# Patient Record
Sex: FEMALE | Race: WHITE | NOT HISPANIC OR LATINO | Employment: FULL TIME | URBAN - METROPOLITAN AREA
[De-identification: names, ages, dates, MRNs, and addresses within clinical notes are randomized per-mention and may not be internally consistent; named-entity substitution may affect disease eponyms.]

---

## 2023-12-02 NOTE — PROGRESS NOTES
"Subjective:      Patient ID: Rama Bravo is a 58 y.o. female.    Chief Complaint:  frequent falls.     History of Present Illness  HPI 58 years old right handed C. Female with non significant PMHx came with Sister for the evaluation of Falls.     Started: about 4 months ago.   Describes: " my legs give up ".   Timing: seconds to minutes ( < than 2 )  Frequency: 4 times in the last few months.   Pain:  lower back / Hips / Knees.   Location: Legs.   Family: none with similar issues.   Medications: Flexeril / Norco / NSAID's.   Worsen: physical activities.   Alleviated: none.   Associated symptoms: Numbness legs.   Triggers: walking / standing / pain.   Prodrome symptoms:  none.     Upon questioning She indicated all of the sudden Knees gave up and fall - Hip / Back pain before falls.   Back pain shooting down to right Leg / diagnosed with " pinch nerve " and has had injections ( last one January 2023 ).   No back Sx.  No Nerve conduction studies.   MRI Lumbar spine done BR general - no report / imagines available.   No B and B incontinence.   Positive Orthostatic dizziness - No Syncope or near Syncope.    Review of Systems  Review of Systems   Neurological:  Positive for numbness.        Knees and Lower back pain.    All other systems reviewed and are negative.    Objective:     Neurologic Exam     Mental Status   Oriented to person, place, and time.   Registration: recalls 3 of 3 objects. Recall at 5 minutes: recalls 3 of 3 objects. Follows 3 step commands.   Attention: normal. Concentration: normal.   Speech: speech is normal   Level of consciousness: alert  Knowledge: good. Able to perform simple calculations.   Able to name object. Able to read. Able to repeat. Able to write. Normal comprehension.     Cranial Nerves     CN II   Visual fields full to confrontation.     CN III, IV, VI   Pupils are equal, round, and reactive to light.  Extraocular motions are normal.   Upgaze: normal  Downgaze: normal  Conjugate " gaze: present  Vestibulo-ocular reflex: present    CN V   Facial sensation intact.     CN VII   Facial expression full, symmetric.     CN VIII   CN VIII normal.     CN IX, X   CN IX normal.   CN X normal.     CN XI   CN XI normal.     CN XII   CN XII normal.     Motor Exam   Muscle bulk: normal  Overall muscle tone: normal    Strength   Strength 5/5 throughout.   Right neck flexion: 5/5  Left neck flexion: 5/5  Right neck extension: 5/5  Left neck extension: 5/5  Right deltoid: 5/5  Left deltoid: 5/5  Right biceps: 5/5  Left biceps: 5/5  Right triceps: 5/5  Left triceps: 5/5  Right wrist flexion: 5/5  Left wrist flexion: 5/5  Right wrist extension: 5/5  Left wrist extension: 5/5  Right interossei: 5/5  Left interossei: 5/5  Right abdominals: 5/5  Left abdominals: 5/5  Right iliopsoas: 5/5  Left iliopsoas: 5/5  Right quadriceps: 5/5  Left quadriceps: 5/5  Right hamstrin/5  Left hamstrin/5  Right glutei: 5/5  Left glutei: 5/5  Right anterior tibial: 5/5  Left anterior tibial: 5/5  Right posterior tibial: 5/5  Left posterior tibial: 5/5  Right peroneal: 5/5  Left peroneal: 5/5  Right gastroc: 5/5  Left gastroc: 5/5    Sensory Exam   Light touch normal.   Vibration normal.   Proprioception normal.   Pinprick normal.   Graphesthesia: normal  Stereognosis: normal  Positive Left SLR sign.      Gait, Coordination, and Reflexes     Gait  Gait: non-neurologic    Coordination   Romberg: negative  Finger to nose coordination: normal  Heel to shin coordination: normal  Tandem walking coordination: normal    Tremor   Resting tremor: absent  Intention tremor: absent  Action tremor: absent    Reflexes   Right brachioradialis: 2+  Left brachioradialis: 2+  Right biceps: 2+  Left biceps: 2+  Right triceps: 2+  Left triceps: 2+  Right patellar: 2+  Left patellar: 2+  Right achilles: 2+  Left achilles: 2+  Right : 2+  Left : 2+  Right plantar: normal  Left plantar: normal  Right Lehman: absent  Left Lehman:  absent  Right ankle clonus: absent  Left ankle clonus: absent  Right pendular knee jerk: absent  Left pendular knee jerk: absentAntalgic gait.        Physical Exam  Vitals and nursing note reviewed.   Constitutional:       Appearance: Normal appearance. She is normal weight.   HENT:      Head: Normocephalic and atraumatic.      Right Ear: Tympanic membrane normal.      Left Ear: Tympanic membrane normal.      Nose: Nose normal.      Mouth/Throat:      Mouth: Mucous membranes are moist.      Pharynx: Oropharynx is clear.   Eyes:      Extraocular Movements: Extraocular movements intact and EOM normal.      Conjunctiva/sclera: Conjunctivae normal.      Pupils: Pupils are equal, round, and reactive to light.   Cardiovascular:      Rate and Rhythm: Normal rate and regular rhythm.      Pulses: Normal pulses.      Heart sounds: Normal heart sounds.   Pulmonary:      Effort: Pulmonary effort is normal.      Breath sounds: Normal breath sounds.   Abdominal:      General: Abdomen is flat. Bowel sounds are normal.      Palpations: Abdomen is soft.   Genitourinary:     Comments: Deferred.   Musculoskeletal:         General: Normal range of motion.      Cervical back: Normal range of motion and neck supple.      Comments: Positive crepitus bilateral Knees to PROM - no swelling and or discoloration.    Skin:     General: Skin is warm and dry.      Capillary Refill: Capillary refill takes less than 2 seconds.   Neurological:      Mental Status: She is alert and oriented to person, place, and time. Mental status is at baseline.      Motor: Motor strength is normal.     Coordination: Finger-Nose-Finger Test, Heel to Shin Test and Romberg Test normal.      Gait: Tandem walk normal.      Deep Tendon Reflexes:      Reflex Scores:       Tricep reflexes are 2+ on the right side and 2+ on the left side.       Bicep reflexes are 2+ on the right side and 2+ on the left side.       Brachioradialis reflexes are 2+ on the right side and 2+ on  "the left side.       Patellar reflexes are 2+ on the right side and 2+ on the left side.       Achilles reflexes are 2+ on the right side and 2+ on the left side.  Psychiatric:         Mood and Affect: Mood normal.         Speech: Speech normal.         Behavior: Behavior normal.         Thought Content: Thought content normal.         Judgment: Judgment normal.        Assessment:   Patient Neurological Assessment is non focal.    - Chronic LBP.  - OA's Hips.   - OA's Knees.   - Falls.   - Lumbar Radiculopathy.     Plan:   Seems to Me Patient Falls are due to  issues - Knees / LB Pain / Hips pain, chronic issues   Patient has been dealing for some time. The possibility of Nerve compression at the Lumbar spine is   A likely possibility due to her Hx and most of her falls are due to left leg - " given up ".    [place] / NSAID's PRN.   X Ray of the both Hips / Knees.   NCS / EMG low extremities.     Please do not hesitate to contact me with any updates, questions or concerns.    Omkar Kelley MD.  General Neurologist.   "

## 2023-12-04 ENCOUNTER — HOSPITAL ENCOUNTER (OUTPATIENT)
Dept: RADIOLOGY | Facility: HOSPITAL | Age: 58
Discharge: HOME | End: 2023-12-04
Attending: PSYCHIATRY & NEUROLOGY
Payer: COMMERCIAL

## 2023-12-04 ENCOUNTER — OFFICE VISIT (OUTPATIENT)
Dept: NEUROLOGY | Facility: CLINIC | Age: 58
End: 2023-12-04
Payer: COMMERCIAL

## 2023-12-04 VITALS
BODY MASS INDEX: 26.34 KG/M2 | HEIGHT: 64 IN | SYSTOLIC BLOOD PRESSURE: 140 MMHG | DIASTOLIC BLOOD PRESSURE: 86 MMHG | HEART RATE: 88 BPM | WEIGHT: 154.31 LBS

## 2023-12-04 DIAGNOSIS — M25.551 CHRONIC PAIN OF BOTH HIPS: ICD-10-CM

## 2023-12-04 DIAGNOSIS — M54.41 CHRONIC BILATERAL LOW BACK PAIN WITH RIGHT-SIDED SCIATICA: Primary | ICD-10-CM

## 2023-12-04 DIAGNOSIS — G89.29 CHRONIC PAIN OF BOTH HIPS: ICD-10-CM

## 2023-12-04 DIAGNOSIS — G89.29 CHRONIC BILATERAL LOW BACK PAIN WITH RIGHT-SIDED SCIATICA: Primary | ICD-10-CM

## 2023-12-04 DIAGNOSIS — G89.29 CHRONIC BILATERAL LOW BACK PAIN WITH RIGHT-SIDED SCIATICA: ICD-10-CM

## 2023-12-04 DIAGNOSIS — M54.41 CHRONIC BILATERAL LOW BACK PAIN WITH RIGHT-SIDED SCIATICA: ICD-10-CM

## 2023-12-04 DIAGNOSIS — M25.561 BILATERAL CHRONIC KNEE PAIN: ICD-10-CM

## 2023-12-04 DIAGNOSIS — M25.562 BILATERAL CHRONIC KNEE PAIN: ICD-10-CM

## 2023-12-04 DIAGNOSIS — G89.29 BILATERAL CHRONIC KNEE PAIN: ICD-10-CM

## 2023-12-04 DIAGNOSIS — M25.552 CHRONIC PAIN OF BOTH HIPS: ICD-10-CM

## 2023-12-04 PROCEDURE — 3079F PR MOST RECENT DIASTOLIC BLOOD PRESSURE 80-89 MM HG: ICD-10-PCS | Mod: CPTII,S$GLB,, | Performed by: PSYCHIATRY & NEUROLOGY

## 2023-12-04 PROCEDURE — 1159F PR MEDICATION LIST DOCUMENTED IN MEDICAL RECORD: ICD-10-PCS | Mod: CPTII,S$GLB,, | Performed by: PSYCHIATRY & NEUROLOGY

## 2023-12-04 PROCEDURE — 99202 OFFICE O/P NEW SF 15 MIN: CPT | Mod: S$GLB,,, | Performed by: PSYCHIATRY & NEUROLOGY

## 2023-12-04 PROCEDURE — 73562 XR KNEE ORTHO BILAT: ICD-10-PCS | Mod: 26,,, | Performed by: RADIOLOGY

## 2023-12-04 PROCEDURE — 73562 X-RAY EXAM OF KNEE 3: CPT | Mod: 26,,, | Performed by: RADIOLOGY

## 2023-12-04 PROCEDURE — 73522 XR HIP 3 OR 4 VIEWS BILATERAL: ICD-10-PCS | Mod: 26,,, | Performed by: RADIOLOGY

## 2023-12-04 PROCEDURE — 73522 X-RAY EXAM HIPS BI 3-4 VIEWS: CPT | Mod: 26,,, | Performed by: RADIOLOGY

## 2023-12-04 PROCEDURE — 1159F MED LIST DOCD IN RCRD: CPT | Mod: CPTII,S$GLB,, | Performed by: PSYCHIATRY & NEUROLOGY

## 2023-12-04 PROCEDURE — 3008F PR BODY MASS INDEX (BMI) DOCUMENTED: ICD-10-PCS | Mod: CPTII,S$GLB,, | Performed by: PSYCHIATRY & NEUROLOGY

## 2023-12-04 PROCEDURE — 1160F RVW MEDS BY RX/DR IN RCRD: CPT | Mod: CPTII,S$GLB,, | Performed by: PSYCHIATRY & NEUROLOGY

## 2023-12-04 PROCEDURE — 99999 PR PBB SHADOW E&M-NEW PATIENT-LVL IV: CPT | Mod: PBBFAC,,, | Performed by: PSYCHIATRY & NEUROLOGY

## 2023-12-04 PROCEDURE — 1160F PR REVIEW ALL MEDS BY PRESCRIBER/CLIN PHARMACIST DOCUMENTED: ICD-10-PCS | Mod: CPTII,S$GLB,, | Performed by: PSYCHIATRY & NEUROLOGY

## 2023-12-04 PROCEDURE — 99999 PR PBB SHADOW E&M-NEW PATIENT-LVL IV: ICD-10-PCS | Mod: PBBFAC,,, | Performed by: PSYCHIATRY & NEUROLOGY

## 2023-12-04 PROCEDURE — 3077F PR MOST RECENT SYSTOLIC BLOOD PRESSURE >= 140 MM HG: ICD-10-PCS | Mod: CPTII,S$GLB,, | Performed by: PSYCHIATRY & NEUROLOGY

## 2023-12-04 PROCEDURE — 73522 X-RAY EXAM HIPS BI 3-4 VIEWS: CPT | Mod: TC

## 2023-12-04 PROCEDURE — 3008F BODY MASS INDEX DOCD: CPT | Mod: CPTII,S$GLB,, | Performed by: PSYCHIATRY & NEUROLOGY

## 2023-12-04 PROCEDURE — 99202 PR OFFICE/OUTPT VISIT, NEW, LEVL II, 15-29 MIN: ICD-10-PCS | Mod: S$GLB,,, | Performed by: PSYCHIATRY & NEUROLOGY

## 2023-12-04 PROCEDURE — 3079F DIAST BP 80-89 MM HG: CPT | Mod: CPTII,S$GLB,, | Performed by: PSYCHIATRY & NEUROLOGY

## 2023-12-04 PROCEDURE — 3077F SYST BP >= 140 MM HG: CPT | Mod: CPTII,S$GLB,, | Performed by: PSYCHIATRY & NEUROLOGY

## 2023-12-04 PROCEDURE — 73562 X-RAY EXAM OF KNEE 3: CPT | Mod: TC,50

## 2023-12-04 RX ORDER — ESCITALOPRAM OXALATE 10 MG/1
10 TABLET ORAL DAILY
COMMUNITY

## 2023-12-04 RX ORDER — MELATONIN 10 MG
CAPSULE ORAL
COMMUNITY

## 2023-12-04 RX ORDER — LUBIPROSTONE 24 UG/1
24 CAPSULE ORAL 2 TIMES DAILY
COMMUNITY

## 2023-12-04 RX ORDER — NAPROXEN 500 MG/1
500 TABLET ORAL 2 TIMES DAILY
COMMUNITY

## 2023-12-04 RX ORDER — CYCLOBENZAPRINE HCL 10 MG
10 TABLET ORAL 3 TIMES DAILY PRN
COMMUNITY

## 2023-12-04 RX ORDER — PRAVASTATIN SODIUM 10 MG/1
10 TABLET ORAL DAILY
COMMUNITY

## 2023-12-04 RX ORDER — HYDROCHLOROTHIAZIDE 25 MG/1
25 TABLET ORAL DAILY
COMMUNITY

## 2023-12-04 RX ORDER — VERAPAMIL HYDROCHLORIDE 120 MG/1
120 CAPSULE, EXTENDED RELEASE ORAL DAILY
COMMUNITY

## 2023-12-04 RX ORDER — METFORMIN HYDROCHLORIDE 1000 MG/1
1000 TABLET ORAL 2 TIMES DAILY WITH MEALS
COMMUNITY

## 2023-12-04 RX ORDER — HYDROCODONE BITARTRATE AND ACETAMINOPHEN 7.5; 325 MG/1; MG/1
1 TABLET ORAL EVERY 6 HOURS PRN
COMMUNITY

## 2024-01-30 ENCOUNTER — TELEPHONE (OUTPATIENT)
Dept: NEUROLOGY | Facility: CLINIC | Age: 59
End: 2024-01-30
Payer: COMMERCIAL

## 2024-01-30 NOTE — TELEPHONE ENCOUNTER
----- Message from Elizabeth Jonesry sent at 1/30/2024  3:32 PM CST -----  .Type:  Patient Call Back    Who Called: PT       Does the patient know what this is regarding?: PT NEVER GOT THE CALL TO SCHEDULE THE NERVE TEST. PLEASE REACH OUT TO PT ON THIS MATTER. SHE MAY NEED THE 2/6/2024 APPOINTMENT RESCHEDULED IF SHE CAN'T GET THE TEST BEFORE THEN     Would the patient rather a call back YES     Best Call Back Number: [phone]    Additional Information: Thank You

## 2024-02-05 DIAGNOSIS — M54.41 CHRONIC BILATERAL LOW BACK PAIN WITH RIGHT-SIDED SCIATICA: Primary | ICD-10-CM

## 2024-02-05 DIAGNOSIS — G89.29 CHRONIC BILATERAL LOW BACK PAIN WITH RIGHT-SIDED SCIATICA: Primary | ICD-10-CM

## 2024-04-02 ENCOUNTER — PROCEDURE VISIT (OUTPATIENT)
Dept: NEUROLOGY | Facility: CLINIC | Age: 59
End: 2024-04-02
Payer: COMMERCIAL

## 2024-04-02 DIAGNOSIS — R29.6 RECURRENT FALLS: Primary | ICD-10-CM

## 2024-04-02 DIAGNOSIS — M54.41 CHRONIC BILATERAL LOW BACK PAIN WITH RIGHT-SIDED SCIATICA: ICD-10-CM

## 2024-04-02 DIAGNOSIS — G89.29 CHRONIC BILATERAL LOW BACK PAIN WITH RIGHT-SIDED SCIATICA: ICD-10-CM

## 2024-04-02 PROCEDURE — 95910 NRV CNDJ TEST 7-8 STUDIES: CPT | Mod: S$GLB,,, | Performed by: PSYCHIATRY & NEUROLOGY

## 2024-04-02 PROCEDURE — 95886 MUSC TEST DONE W/N TEST COMP: CPT | Mod: S$GLB,,, | Performed by: PSYCHIATRY & NEUROLOGY

## 2024-04-02 NOTE — PROCEDURES
[facility]   Lorenza Johnson  Department of Neurology  [address] [place] [address] [facility]  [address]  Phone [phone]     Fax  [phone]        Full Name: Rama Bravo Gender: Female  Patient ID: [id number] YOB: 1965      Visit Date: 4/2/2024 8:26 AM  Age: 58 Years  Examining Physician: Jacquelyn Tomas M.D.  Referring Physician: Omkar Bello MD  History: EMG/NCS/BLE/Lumbosacral radiculopathy.  C/O: Falls, back pain with radiation down leg.  PMHX: Chronic LBP, OA, lumbar radiculopathy, DM, HPV, HTN, carotid stenosis, Vitamin D deficiency, HLD, hx of left CTR, facet arthropathy on left side at L5-S1.      SUMMARY     Nerve conduction studies were performed in the right and left lower extremities. The right peroneal motor study recording the extensor digitorum brevis showed a normal amplitude, normal distal latency and normal conduction velocity.  No conduction block or focal slowing was present across the fibular neck. The right tibial motor study recording the abductor hallucis brevis showed a normal amplitude, normal distal latency and normal conduction velocity.     The left peroneal motor study recording the extensor digitorum brevis showed a normal amplitude, normal distal latency and normal conduction velocity.  No conduction block or focal slowing was present across the fibular neck. The left tibial motor study recording the abductor hallucis brevis showed a normal amplitude, normal distal latency and normal conduction velocity.    Right sural sensory response showed a normal amplitude and conduction velocity.  Right superficial peroneal sensory response showed a normal amplitude and conduction velocity.        Left sural sensory response showed a normal amplitude and conduction velocity. Left superficial peroneal sensory response showed a normal amplitude and conduction velocity.      Needle EMG of the right lower extremity muscles was performed. Motor unit  potentials were large, long and polyphasic with reduced recruitment in the tibialis anterior, extensor hallucis longus, flexor digitorum longus, tibialis posterior, gluteus medius, and tensor fascia latae muscles.     Needle EMG of the left lower extremity muscles was performed. Motor unit potentials were large, long and polyphasic with reduced recruitment in the tibialis anterior, extensor hallucis longus, flexor digitorum longus, tibialis posterior, gluteus medius, and tensor fascia latae muscles.            IMPRESSION       There is electrophysiologic evidence consistent with a chronic, bilateral L5 radiculopathy.     There is no electrophysiologic evidence of lumbosacral plexopathy or peripheral neuropathy in the lower extremities.         ---------------------------------             Jacquelyn Tomas M.D., F.A.A.N.      Diplomate, American Board of Psychiatry and Neurology  Diplomate, American Board of Clinical Neurophysiology  Fellow, American Academy of Neurology             SENSORY NCS      Nerve / Sites Rec. Site Peak PP Amp Dist Mo     ms µV cm m/s   L Superficial peroneal      Lat Leg Ankle 3.65 14.5 10 40.6      Ref.  4.60 3.0  40.0   L Sural - Lat Mall      Calf Lat Mall 4.31 10.8 14 39.8      Ref.  4.60 3.0  40.0   R Superficial peroneal      Lat Leg Ankle 3.38 13.1 10 40.1      Ref.  4.60 3.0  40.0   R Sural - Lat Mall      Calf Lat Mall 4.33 13.4 14 41.5      Ref.  4.60 3.0  40.0       MOTOR NCS      Nerve / Sites Rec. Site Lat Amp Dist Mo     ms mV cm m/s   L Peroneal - EDB      Ankle EDB 4.38 3.2 8       Ref.  6.00 2.5        FibHead EDB 12.67 2.7 36 43.4      Ref.     40.0      Knee EDB 14.88 2.7 10 45.3   L Tibial - AH      Ankle AH 4.54 6.4 8 17.6      Ref.  6.00 4.0        Knee AH 12.73 4.5 36 44.0      Ref.     40.0   R Peroneal - EDB      Ankle EDB 4.42 4.3 8       Ref.  6.00 2.5        FibHead EDB 12.23 4.2 33 42.2      Ref.     40.0      Knee EDB 14.48 4.0 10 44.4   R Tibial - AH      Ankle AH  3.71 5.8 8       Ref.  6.00 4.0        Knee AH 12.06 4.1 37 44.3      Ref.     40.0       EMG Summary Table     Spontaneous MUAP Recruitment   Muscle Nerve Roots IA Fib PSW Fasc Other Amp Dur. PPP Pattern   Vastus lateralis Femoral L2-L4 N None None None - N N N N   Tibialis anterior Deep peroneal (Fibular) L4-L5 N None None None - +1 +1 +1 RED   Gastrocnemius (Medial head) Tibial S1-S2 N None None None - N N N N   Gluteus medius Superior gluteal L4-S1 N None None None - N/+1 N N/+1 N/SL RED    Tensor fasciae latae Superior gluteal L4-S1 N None None None - +1 +1 +1 RED   Tibialis posterior Tibial L4-L5 N None None None - +1 +1 +1 RED   Extensor hallucis longus Deep peroneal (Fibular) L5-S1 N None None None - N/+1 N N/+1 N/SL RED    Flexor digitorum longus Tibial L5-S2 N None None None - N/+1 N N/+1 N/SL RED                              ---------------------------------             Jacquelyn Tomas M.D., F.A.A.N.      Diplomate, American Board of Psychiatry and Neurology  Diplomate, American Board of Clinical Neurophysiology  Fellow, American Academy of Neurology

## 2024-04-14 NOTE — PROGRESS NOTES
Subjective:       Patient ID: Rama Bravo is a 58 y.o. female.    Chief Complaint: No chief complaint on file.    HPI    The patient presented on 12/ 2023  for evaluation of frequents falls.   New issues: None.   Falls: none in the last month or so.   Brought MRI Lumbar spine disc.  Also to discuss NCS/ EMG.       Review of Systems          Current Outpatient Medications:     calcium-vitamin D 250 mg-2.5 mcg (100 unit) per tablet, Take 1 tablet by mouth 2 (two) times daily., Disp: , Rfl:     cyclobenzaprine (FLEXERIL) 10 MG tablet, Take 10 mg by mouth 3 (three) times daily as needed for Muscle spasms., Disp: , Rfl:     EScitalopram oxalate (LEXAPRO) 10 MG tablet, Take 10 mg by mouth once daily., Disp: , Rfl:     hydroCHLOROthiazide (HYDRODIURIL) 25 MG tablet, Take 25 mg by mouth once daily., Disp: , Rfl:     HYDROcodone-acetaminophen (NORCO) 7.5-325 mg per tablet, Take 1 tablet by mouth every 6 (six) hours as needed for Pain., Disp: , Rfl:     linaCLOtide (LINZESS) 145 mcg Cap capsule, Take 145 mcg by mouth before breakfast., Disp: , Rfl:     lubiprostone (AMITIZA) 24 MCG Cap, Take 24 mcg by mouth 2 (two) times daily., Disp: , Rfl:     melatonin 10 mg Cap, Take by mouth., Disp: , Rfl:     metFORMIN (GLUCOPHAGE) 1000 MG tablet, Take 1,000 mg by mouth 2 (two) times daily with meals., Disp: , Rfl:     naproxen (EC NAPROSYN) 500 MG EC tablet, Take 500 mg by mouth 2 (two) times daily., Disp: , Rfl:     pravastatin (PRAVACHOL) 10 MG tablet, Take 10 mg by mouth once daily., Disp: , Rfl:     verapamiL (VERELAN) 120 MG C24P, Take 120 mg by mouth once daily., Disp: , Rfl:     Past Medical History:   Diagnosis Date    Cancer     Diabetes mellitus     HPV (human papilloma virus) anogenital infection     Hypertension     Movement disorder        Past Surgical History:   Procedure Laterality Date    adnoidectomy      CARPAL TUNNEL RELEASE      FOOT SURGERY      TONSILLECTOMY         Social History     Socioeconomic History     "Marital status:    Tobacco Use    Smoking status: Former     Types: Cigarettes       Past/Current Medical/Surgical History, Past/Current Social History, Past/Current Family History and Past/Current Medications were reviewed in detail.      Objective:     VITAL SIGNS WERE REVIEWED      GENERAL APPEARANCE:     The patient looks comfortable.    BMI    No signs of respiratory distress.    Normal breathing pattern.    No dysmorphic features    Normal eye contact.       GENERAL MEDICAL EXAM:    HEENT:  Head is atraumatic normocephalic.     FUNDOSCOPIC (OPHTHALMOSCOPIC) EXAMINATION showed no disc edema (papilledema).      NECK: No JVD. No visible lesions or goiters.     CHEST-CARDIOPULMONARY: No cyanosis. No tachypnea. Normal respiratory effort.    LTYUOGX-[id number]-[id number]: No jaundice. No stomas or lesions. No visible hernias. No catheters.     SKIN, HAIR, NAILS: No pathognomonic skin rash.No neurofibromatosis. No visible lesions.No stigmata of autoimmune disease. No clubbing.    LIMBS: No varicose veins. No visible swelling.    MUSCULOSKELETAL: No visible deformities.No visible lesions.    Neurologic Exam      No results found for: "WBC", "HGB", "HCT", "MCV", "PLT"    No results found for: "NA", "K", "CL", "CO2", "GLU", "BUN", "CREATININE", "CALCIUM", "PROT", "ALBUMIN", "BILITOT", "ALKPHOS", "AST", "ALT", "ANIONGAP", "ESTGFRAFRICA", "EGFRNONAA"    No results found for: "RRHZDIYF47"    No results found for: "TSH", "J4DNSCN", "J3FBYVQ", "THYROIDAB", "FREET4"    No results found in the last 24 hours.    No results found in the last 24 hours.      LABORATORY EVALUATION      RADIOLOGY EVALUATION       NEUROPHYSIOLOGY EVALUATION       PATHOLOGY EVALUATION        NEUROCOGNITIVE AND NEUROPSYCHOLOGY EVALUATION     Reviewed the neuroimaging independently     Assessment:   Patient Neurological Assessment is non focal.    - Chronic LBP.  - OA's Hips.   - OA's Knees.   - Falls.   - Lumbar Radiculopathy.      Plan: " "  Stable.   Discussed NCS/ EMG - has had Lumbar spine injections ( type ? ) / some helps with it.   She refused being evaluated by Ortho and or Neuro surgeries service - " I do not have any intention for Surgery intervention " as She indicated.    Continue Pain Management - next appointment June 2024.   Refer to PT program - back school.  Will download disc / reports ( NCS ) to Patient file.     [place] / NSAID's PRN.   X Ray of the both Hips - done     - .... Mild sacroiliac degenerative changes bilaterally. No significant degenerative changes involving the hips.   X Ray B - Knees -  no acute changes.    NCS / EMG low extremities - done 05/ 2023 - chronic bilateral L 5 radiculopathy - discussed with Patient.     Please do not hesitate to contact me with any updates, questions or concerns.    MEDICAL/SURGICAL COMORBIDITIES     All relevant medical comorbidities noted and managed by primary care physician and medical care team.      HEALTHY LIFESTYLE AND PREVENTATIVE CARE    The patient to adhere to the age-appropriate health maintenance guidelines including screening tests and vaccinations.   The patient to adhere to  healthy lifestyle, optimal weight, exercise, healthy diet, good sleep hygiene and avoiding drugs including smoking, alcohol and recreational drugs.    No follow-ups on file.    Omkar Bello MD    General Neurology.  "

## 2024-04-16 ENCOUNTER — OFFICE VISIT (OUTPATIENT)
Dept: NEUROLOGY | Facility: CLINIC | Age: 59
End: 2024-04-16
Payer: COMMERCIAL

## 2024-04-16 VITALS
WEIGHT: 149.94 LBS | SYSTOLIC BLOOD PRESSURE: 160 MMHG | HEART RATE: 92 BPM | BODY MASS INDEX: 25.6 KG/M2 | HEIGHT: 64 IN | DIASTOLIC BLOOD PRESSURE: 94 MMHG

## 2024-04-16 DIAGNOSIS — R29.6 RECURRENT FALLS: ICD-10-CM

## 2024-04-16 DIAGNOSIS — M54.41 CHRONIC BILATERAL LOW BACK PAIN WITH RIGHT-SIDED SCIATICA: Primary | ICD-10-CM

## 2024-04-16 DIAGNOSIS — G89.29 CHRONIC BILATERAL LOW BACK PAIN WITH RIGHT-SIDED SCIATICA: Primary | ICD-10-CM

## 2024-04-16 PROCEDURE — 3008F BODY MASS INDEX DOCD: CPT | Mod: CPTII,S$GLB,, | Performed by: PSYCHIATRY & NEUROLOGY

## 2024-04-16 PROCEDURE — 3077F SYST BP >= 140 MM HG: CPT | Mod: CPTII,S$GLB,, | Performed by: PSYCHIATRY & NEUROLOGY

## 2024-04-16 PROCEDURE — 1159F MED LIST DOCD IN RCRD: CPT | Mod: CPTII,S$GLB,, | Performed by: PSYCHIATRY & NEUROLOGY

## 2024-04-16 PROCEDURE — 99999 PR PBB SHADOW E&M-EST. PATIENT-LVL V: CPT | Mod: PBBFAC,,, | Performed by: PSYCHIATRY & NEUROLOGY

## 2024-04-16 PROCEDURE — 99213 OFFICE O/P EST LOW 20 MIN: CPT | Mod: S$GLB,,, | Performed by: PSYCHIATRY & NEUROLOGY

## 2024-04-16 PROCEDURE — 3080F DIAST BP >= 90 MM HG: CPT | Mod: CPTII,S$GLB,, | Performed by: PSYCHIATRY & NEUROLOGY

## 2024-04-16 PROCEDURE — 1160F RVW MEDS BY RX/DR IN RCRD: CPT | Mod: CPTII,S$GLB,, | Performed by: PSYCHIATRY & NEUROLOGY

## 2024-06-07 ENCOUNTER — CLINICAL SUPPORT (OUTPATIENT)
Dept: REHABILITATION | Facility: HOSPITAL | Age: 59
End: 2024-06-07
Payer: COMMERCIAL

## 2024-06-07 DIAGNOSIS — M54.41 CHRONIC BILATERAL LOW BACK PAIN WITH RIGHT-SIDED SCIATICA: ICD-10-CM

## 2024-06-07 DIAGNOSIS — R29.6 RECURRENT FALLS: ICD-10-CM

## 2024-06-07 DIAGNOSIS — M53.86 DECREASED ROM OF INTERVERTEBRAL DISCS OF LUMBAR SPINE: Primary | ICD-10-CM

## 2024-06-07 DIAGNOSIS — R53.1 GENERAL WEAKNESS: ICD-10-CM

## 2024-06-07 DIAGNOSIS — G89.29 CHRONIC BILATERAL LOW BACK PAIN WITH RIGHT-SIDED SCIATICA: ICD-10-CM

## 2024-06-07 PROCEDURE — 97161 PT EVAL LOW COMPLEX 20 MIN: CPT | Mod: PN

## 2024-06-07 PROCEDURE — 97110 THERAPEUTIC EXERCISES: CPT | Mod: PN

## 2024-06-07 PROCEDURE — 97140 MANUAL THERAPY 1/> REGIONS: CPT | Mod: PN

## 2024-06-07 NOTE — PLAN OF CARE
[facility]   Physical Therapy Initial Evaluation      Date: 6/7/2024   Name: Rama Bravo  Clinic Number: [id number]    Therapy Diagnosis:    Encounter Diagnoses   Name Primary?    Chronic bilateral low back pain with right-sided sciatica     Recurrent falls     Decreased ROM of intervertebral discs of lumbar spine Yes    General weakness       Physician: Taye Bello*     Physician Orders: PT Eval and Treat  Medical Diagnosis from Referral: M54.41,G89.29 (ICD-10-CM) - Chronic bilateral low back pain with right-sided sciatica R29.6 (ICD-10-CM) - Recurrent falls  Evaluation Date: 6/7/2024  Authorization Period Expiration: 09/17/2025  Plan of Care Expiration: 9/7/2024  Progress Note Due: 7/7/2024  Visit # / Visits authorized: 1/1   FOTO: 1/3 (last performed on 6/7/2024)    Precautions: Standard    Time In: 700  Time Out: 800  Total Billable Time (timed & untimed codes): 60 minutes    Subjective     Date of onset: 4 years     History of current condition - Rama reports she began having pain years ago and was barley able to walk and went to pain management. Pt reports back improving after receiving injections over the next 6 months. Pt reports she began having episodes of fall on her knees starting in August 2023 reports 33 falls since, where she would fall forward and land on her knees with no bodily warning of when it would happen. Pt reports clicking of bones in her right lower back when she walks    Falls: reports 33 falls over the last year reporting landing her knees     Imaging: [x] Xray [] MRI [] CT: Performed on: 12/4/23   Hip: Lower lumbar and lumbar sacral degenerative changes. Mild sacroiliac degenerative changes bilaterally. Both hip joints are maintained. No acute fracture or dislocation. No significant degenerative changes involving the hips.  Knees: Medial lateral compartment are maintained bilaterally. No acute fracture on either side. No significant degenerative  changes. No joint effusion on either side. Atherosclerosis bilaterally.     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   Current Level of Function: Independent with all ADL, IADL, community mobility and functional activities with reports of increased pain and need for increased time and frequent breaks.      Dominant Extremity:    [x] Right    [] Left    Pts goals: Pt reported goals are to decrease overall pain levels in order to return to prior functional level.     Medical History:   Past Medical History:   Diagnosis Date    Cancer     Diabetes mellitus     HPV (human papilloma virus) anogenital infection     Hypertension     Movement disorder        Surgical History:   Rama Bravo  has a past surgical history that includes Carpal tunnel release; Foot surgery; Tonsillectomy; and adnoidectomy.    Medications:   Rama has a current medication list which includes the following prescription(s): calcium-vitamin d, cyclobenzaprine, escitalopram oxalate, hydrochlorothiazide, hydrocodone-acetaminophen, linaclotide, lubiprostone, melatonin, metformin, naproxen, pravastatin, and verapamil.    Allergies:   Review of patient's allergies indicates:   Allergen Reactions    Sulfamethoxazole-trimethoprim Nausea Only        Objective        Lumbar Range of Motion:    % Limitation Pain   Flexion 25   yes        Extension 25   yes        Left Side Bending 0 yes        Right Side Bending 10 yes        Left rotation   10 yes        Right Rotation   10  yes                 STRENGTH:   L/E MMT Right  (spine) Left Pain/Dysfunction with Movement Goal   Modified (90/90) Abdominal Strength  NT ---     Hip Flexion  4/5 5/5  4+/5 B   Hip Extension  4/5 4/5  4+/5 B   Hip Abduction  4/5 4/5  4+/5 B   Knee Extension 4/5 4/5  5/5 B   Knee Flexion 4/5 4/5  5/5 B   Ankle DF 4+/5 4+/5  5/5 B   Ankle PF 4+/5 4+/5  5/5 B      DTR:   L 3/4: right 1+ left 2+  S1: right 2+ left 2+  [employer]: positive    Muscle length: Hamstrings limited bilaterally. Goal: normal            SPECIAL TESTS:    Right  (spine) Left  Goal   Straight Leg Raise [] Positive    [x] Negative [] Positive    [x] Negative Negative B    A [] Positive    [x] Negative --- Negative    ASIS Distraction  [] Positive    [x] Negative --- Negative    Posterior Shear [] Positive    [x] Negative [] Positive    [x] Negative Negative B    GABE [] Positive    [x] Negative [] Positive    [x] Negative Negative B           SENSATION  [x] Intact to Light Touch   [] Impaired:      PALPATION: Structures: Increased tenderness to palpation of: right , LOWER STRUCTURES : PSIS      POSTURE:  Pt presents with postural abnormalities which include:    [x] Forward Head   [] Increased Lumbar Lordosis   [x] Rounded Shoulder   [] Genu Recurvatum   [] Increased Thoracic Kyphosis [] Genu Valgus   [] Trunk Deviated    [] Pes Planus   [] Scapular Winging    [] Other:       Balance:  mCTSIB  Goal: 30sec ea  EO: 30s  EC: 30s  Foam EO: 30s moderate sway  Foam EC: 10s LOB corrected by PT    Walking with head turn:  Left right - ataxic gait deviating from mid line  Up/down- ataxic gait deviating from mid line      GAIT ANALYSIS The patient ambulated with the following assistive device: none; the pt presents with the following gait abnormalities: decreased knee flexion on left      Function:     Intake Outcome Measure for FOTO Lumbar Spine Survey    Therapist reviewed FOTO scores for Rama on 6/7/2024.   FOTO report - see Media section or FOTO account  for episode details    Intake Score: 40%         Treatment     Total Treatment time (time-based codes) separate from Evaluation: (20) minutes     Rama received the treatments listed below:          MANUAL THERAPY TECHNIQUES were applied for (8) minutes, including:    SIJ prone HVLAT R  Hip PROM in all directions     THERAPEUTIC EXERCISES to develop strength, endurance, ROM, flexibility, posture, and core stabilization for (2) minutes including:    Discussed and performed HEP    Patient Education and Home Exercises     Education provided: (5) minutes  PURPOSE: Patient educated on the impairments noted above and the effects of physical therapy intervention to improve overall condition and QOL.   EXERCISE: Patient was educated on all the above exercise prior/during/after for proper posture, positioning, and execution for safe performance with home exercise program.   STRENGTH: Patient educated on the importance of improved core and extremity strength in order to improve alignment of the spine and extremities with static positions and dynamic movement.     Written Home Exercises Provided: yes.  Exercises were reviewed and Rama was able to demonstrate them prior to the end of the session.  Rama demonstrated good  understanding of the education provided. See EMR under Patient Instructions for exercises provided during therapy sessions.    Assessment     Rama is a 58 y.o. female referred to outpatient Physical Therapy with a medical diagnosis of M54.41,G89.29 (ICD-10-CM) - Chronic bilateral low back pain with right-sided sciatica R29.6 (ICD-10-CM) - Recurrent falls. Pt presents with impairments in the following categories: IMPAIRMENTS: ROM, strength, endurance, balance, and core strength and stability    Pt prognosis is Excellent  Pt will benefit from skilled outpatient Physical Therapy to address the deficits stated above and in the chart below, provide pt/family education, and to maximize pt's level of independence.  "    Plan of care discussed with patient: Yes  Pt's spiritual, cultural and educational needs considered and patient is agreeable to the plan of care and goals as stated below:     Anticipated Barriers for therapy: none    Medical Necessity is demonstrated by the following  History  Co-morbidities and personal factors that may impact the plan of care [] LOW: no personal factors / co-morbidities  [x] MODERATE: 1-2 personal factors / co-morbidities  [] HIGH: 3+ personal factors / co-morbidities    Moderate / High Support Documentation:   Past Medical History:   Diagnosis Date    Cancer     Diabetes mellitus     HPV (human papilloma virus) anogenital infection     Hypertension     Movement disorder         Examination  Body Structures and Functions, activity limitations and participation restrictions that may impact the plan of care [x] LOW: addressing 1-2 elements  [] MODERATE: 3+ elements  [] HIGH: 4+ elements (please support below)    Moderate / High Support Documentation: See above in "Current Level of Function"      Clinical Presentation [x] LOW: stable  [] MODERATE: Evolving  [] HIGH: Unstable     Decision Making/ Complexity Score: low         Short Term Goals:  6 weeks Status  Date Met   PAIN: Pt will report worst pain of 5/10 in order to progress toward max functional ability and improve quality of life. [x] Progressing  [] Met  [] Not Met    FUNCTION: Patient will demonstrate improved function as indicated by a score of greater than or equal to 45 out of 100 on FOTO. [x] Progressing  [] Met  [] Not Met    MOBILITY: Patient will improve AROM to 50% of stated goals, listed in objective measures above, in order to progress towards independence with functional activities.  [x] Progressing  [] Met  [] Not Met    STRENGTH: Patient will improve strength to 50% of stated goals, listed in objective measures above, in order to progress towards independence with functional activities. [x] Progressing  [] Met  [] Not Met  "   POSTURE: Patient will correct postural deviations in sitting and standing, to decrease pain and promote long term stability.  [x] Progressing  [] Met  [] Not Met    GAIT: Patient will demonstrate improved gait mechanics including increased knee flexon on left in order to improve functional mobility, improve quality of life, and decrease risk of further injury or fall.  [x] Progressing  [] Met  [] Not Met    HEP: Patient will demonstrate independence with HEP in order to progress toward functional independence. [x] Progressing  [] Met  [] Not Met      Long Term Goals:  12 weeks Status Date Met   PAIN: Pt will report worst pain of 1/10 in order to progress toward max functional ability and improve quality of life [x] Progressing  [] Met  [] Not Met    FUNCTION: Patient will demonstrate improved function as indicated by a score of greater than or equal to 52 out of 100 on FOTO. [x] Progressing  [] Met  [] Not Met    MOBILITY: Patient will improve AROM to stated goals, listed in objective measures above, in order to return to maximal functional potential and improve quality of life.  [x] Progressing  [] Met  [] Not Met    STRENGTH: Patient will improve strength to stated goals, listed in objective measures above, in order to improve functional independence and quality of life.  [x] Progressing  [] Met  [] Not Met    GAIT: Patient will demonstrate normalized gait mechanics with minimal compensation in order to return to PLOF. [x] Progressing  [] Met  [] Not Met    Patient will return to normal ADL's, IADL's, community involvement, recreational activities, and work-related activities with less than or equal to 1/10 pain and maximal function.  [x] Progressing  [] Met  [] Not Met    Patient to be able to walk without gait devation while performing head turns. [x] Progressing  [] Met  [] Not Met    Patient to be able to perform 30 seconds in each section of mCTSIB [x] Progressing  [] Met  [] Not Met      Plan     Plan of  care Certification: 6/7/2024 to 9/7/2024.    Outpatient Physical Therapy 2 times weekly for 12 weeks to include any combination of the following interventions: virtual visits, dry needling, modalities, electrical stimulation (IFC, Pre-Mod, Attended with Functional Dry Needling), Cervical/Lumbar Traction, Gait Training, Manual Therapy, Neuromuscular Re-ed, Patient Education, Self Care, Therapeutic Activities, Therapeutic Exercise, and Therapeutic Activites     Shekhar Johnson, PT, DPT

## 2024-06-13 ENCOUNTER — CLINICAL SUPPORT (OUTPATIENT)
Dept: REHABILITATION | Facility: HOSPITAL | Age: 59
End: 2024-06-13
Payer: COMMERCIAL

## 2024-06-13 DIAGNOSIS — M53.86 DECREASED ROM OF INTERVERTEBRAL DISCS OF LUMBAR SPINE: ICD-10-CM

## 2024-06-13 DIAGNOSIS — R53.1 GENERAL WEAKNESS: ICD-10-CM

## 2024-06-13 DIAGNOSIS — R29.6 RECURRENT FALLS: Primary | ICD-10-CM

## 2024-06-13 PROCEDURE — 97110 THERAPEUTIC EXERCISES: CPT | Mod: PN

## 2024-06-13 PROCEDURE — 97112 NEUROMUSCULAR REEDUCATION: CPT | Mod: PN

## 2024-06-13 NOTE — PROGRESS NOTES
"[facility]   Physical Therapy Treatment Note        Name: Rama Bravo  Clinic Number: [id number]    Therapy Diagnosis: No diagnosis found.  Physician: Taye Bello*    Visit Date: 6/13/2024    Physician Orders: PT Eval and Treat  Medical Diagnosis from Referral: M54.41,G89.29 (ICD-10-CM) - Chronic bilateral low back pain with right-sided sciatica R29.6 (ICD-10-CM) - Recurrent falls  Evaluation Date: 6/7/2024  Authorization Period Expiration: 09/17/2025  Plan of Care Expiration: 9/7/2024  Progress Note Due: 7/7/2024  Visit # / Visits authorized: 1/1   FOTO: 1/3 (last performed on 6/7/2024)     Precautions: Standard  PTA Visit #: 0/5     Time In: 1100  Time Out: 1200  Total Billable Time: 55 minutes (Billing reflects 1 on 1 treatment time spent with patient)    Subjective     Patient reports: she feels weak coming into today's session.    He/She was compliant with home exercise program.  Response to previous treatment: no notable change  Functional change: no notable change    Pain: 6/10     Location: lower back    Objective      Objective Measures updated at progress report or POC update only unless otherwise noted.       Treatment     Rama received the treatments listed below:     CPT Intervention  Joint[employer] Duration / Intensity  6/13   TE Bike 6'   TE Shuttle press DL 5B 3', SL 2B 2x10 B    TE Knee ext DL 10# 2x10   TE  HS curl DL 20# 2x10    NMR  Walking /c head turns  left-right/up-down 2 laps ea    NMR Step backs 10x B,     NMR Step forward 10x B    NMR  NBOS /c pertibations 30" 3x    NMR Stepping strategies Forward/ backward/ lateral 2x10 B ea   PLAN             CPT Codes available for Billing:   (0) minutes of Manual therapy (MT) to improve pain and ROM.  (30) minutes of Therapeutic Exercise (TE) to develop strength, endurance, range of motion, and flexibility.  (25) minutes of Neuromuscular Re-Education (NMR)  to improve: Balance, Coordination, Kinesthetic, Sense, " Proprioception, and Posture.  (0) minutes of Therapeutic Activities (TA) to improve functional performance.  Unattended Electrical Stimulation (ES) for muscle performance or pain modulation.  Vasopneumatic Device Therapy () for management of swelling/edema. ([id number])  BFR: Blood flow restriction applied during exercise  NP or (-): Not Performed       Patient Education and Home Exercises       Home Exercises Provided and Patient Education Provided     Education provided: (5) minutes  PURPOSE: Patient educated on the impairments noted above and the effects of physical therapy intervention to improve overall condition and QOL.   EXERCISE: Patient was educated on all the above exercise prior/during/after for proper posture, positioning, and execution for safe performance with home exercise program.   GAIT & BALANCE: Patient educated on the importance of strong core and lower extremity musculature in order to improve both static and dynamic balance, improve gait mechanics, reduce fall risk and improve household and community mobility.     Written Home Exercises Provided: yes.  Exercises were reviewed and Rama was able to demonstrate them prior to the end of the session.  Rama demonstrated good  understanding of the education provided. See EMR under Patient Instructions for exercises provided during therapy sessions.    Assessment     Patient tolerated today's session with minimal complaints of pain. Patient displayed bilateral lower extremity weakness with multiple rest breaks due to deconditioning. Patient had lateral gait ataxia when adding head turns both vertical and horizontally. Today we also worked on ankle and stepping strategies to improve reaction when having loss of balance. Patient will continue to benefit from.    Rama is progressing well towards her goals.   Pt prognosis is Excellent.     Pt will continue to benefit from skilled outpatient physical therapy to address the deficits listed in the problem  list box on initial evaluation, provide pt/family education and to maximize pt's level of independence in the home and community environment.     Pt's spiritual, cultural and educational needs considered and pt agreeable to plan of care and goals.     Anticipated Barriers for therapy: none    Short Term Goals:  6 weeks Status  Date Met   PAIN: Pt will report worst pain of 5/10 in order to progress toward max functional ability and improve quality of life. [x] Progressing  [] Met  [] Not Met     FUNCTION: Patient will demonstrate improved function as indicated by a score of greater than or equal to 45 out of 100 on FOTO. [x] Progressing  [] Met  [] Not Met     MOBILITY: Patient will improve AROM to 50% of stated goals, listed in objective measures above, in order to progress towards independence with functional activities.  [x] Progressing  [] Met  [] Not Met     STRENGTH: Patient will improve strength to 50% of stated goals, listed in objective measures above, in order to progress towards independence with functional activities. [x] Progressing  [] Met  [] Not Met     POSTURE: Patient will correct postural deviations in sitting and standing, to decrease pain and promote long term stability.  [x] Progressing  [] Met  [] Not Met     GAIT: Patient will demonstrate improved gait mechanics including increased knee flexon on left in order to improve functional mobility, improve quality of life, and decrease risk of further injury or fall.  [x] Progressing  [] Met  [] Not Met     HEP: Patient will demonstrate independence with HEP in order to progress toward functional independence. [x] Progressing  [] Met  [] Not Met        Long Term Goals:  12 weeks Status Date Met   PAIN: Pt will report worst pain of 1/10 in order to progress toward max functional ability and improve quality of life [x] Progressing  [] Met  [] Not Met     FUNCTION: Patient will demonstrate improved function as indicated by a score of greater than or  equal to 52 out of 100 on FOTO. [x] Progressing  [] Met  [] Not Met     MOBILITY: Patient will improve AROM to stated goals, listed in objective measures above, in order to return to maximal functional potential and improve quality of life.  [x] Progressing  [] Met  [] Not Met     STRENGTH: Patient will improve strength to stated goals, listed in objective measures above, in order to improve functional independence and quality of life.  [x] Progressing  [] Met  [] Not Met     GAIT: Patient will demonstrate normalized gait mechanics with minimal compensation in order to return to PLOF. [x] Progressing  [] Met  [] Not Met     Patient will return to normal ADL's, IADL's, community involvement, recreational activities, and work-related activities with less than or equal to 1/10 pain and maximal function.  [x] Progressing  [] Met  [] Not Met     Patient to be able to walk without gait devation while performing head turns. [x] Progressing  [] Met  [] Not Met     Patient to be able to perform 30 seconds in each section of mCTSIB [x] Progressing  [] Met  [] Not Met         Plan     Continue Plan of Care (POC) and progress per patient tolerance. See treatment section for details on planned progressions next session.      Shekhar Johnson, PT

## 2024-06-19 ENCOUNTER — DOCUMENTATION ONLY (OUTPATIENT)
Dept: REHABILITATION | Facility: HOSPITAL | Age: 59
End: 2024-06-19
Payer: COMMERCIAL

## 2024-06-19 NOTE — PROGRESS NOTES
Pt came to today's session stating she feels weak, lightheaded, and dizzy. Pt reports she has only had one nutrigrain bar and has been drinking water for the last 2 days. I canceled today's visit and I encouraged her to follow up with her primary care MD.

## 2024-06-25 ENCOUNTER — CLINICAL SUPPORT (OUTPATIENT)
Dept: REHABILITATION | Facility: HOSPITAL | Age: 59
End: 2024-06-25
Payer: COMMERCIAL

## 2024-06-25 DIAGNOSIS — R29.6 RECURRENT FALLS: Primary | ICD-10-CM

## 2024-06-25 DIAGNOSIS — R53.1 GENERAL WEAKNESS: ICD-10-CM

## 2024-06-25 DIAGNOSIS — M53.86 DECREASED ROM OF INTERVERTEBRAL DISCS OF LUMBAR SPINE: ICD-10-CM

## 2024-06-25 PROCEDURE — 97112 NEUROMUSCULAR REEDUCATION: CPT | Mod: PN

## 2024-06-25 NOTE — PROGRESS NOTES
"[facility]   Physical Therapy Treatment Note        Name: Rama Bravo  Clinic Number: [id number]    Therapy Diagnosis:   Encounter Diagnoses   Name Primary?    Recurrent falls Yes    Decreased ROM of intervertebral discs of lumbar spine     General weakness      Physician: Taye Bello*    Visit Date: 6/25/2024    Physician Orders: PT Eval and Treat  Medical Diagnosis from Referral: M54.41,G89.29 (ICD-10-CM) - Chronic bilateral low back pain with right-sided sciatica R29.6 (ICD-10-CM) - Recurrent falls  Evaluation Date: 6/7/2024  Authorization Period Expiration: 09/17/2025  Plan of Care Expiration: 9/7/2024  Progress Note Due: 7/7/2024  Visit # / Visits authorized: 2/20+eval   FOTO: 1/3 (last performed on 6/7/2024)     Precautions: Standard  PTA Visit #: 0/5     Time In: 1000  Time Out: 1100  Total Billable Time: 15 minutes (Billing reflects 1 on 1 treatment time spent with patient)    Subjective     Patient reports: feel much better following recovering from sickness    He/She was compliant with home exercise program.  Response to previous treatment: no notable change  Functional change: no notable change    Pain: 6/10     Location: lower back    Objective      Objective Measures updated at progress report or POC update only unless otherwise noted.       Treatment     Rama received the treatments listed below:     CPT Intervention  JointFocus Duration / Intensity  6/13   TE Bike 6'   TE Shuttle press DL 5B 3', SL 2B 2x10 B    TE Knee ext DL 10# 2x10   TE  HS curl DL 20# 2x10    NMR  Walking /c head turns  left-right/up-down 2 laps ea    NMR Step backs 10x B,     NMR Step forward 10x B    NMR  NBOS /c pertibations 30" 3x    NMR Stepping strategies Forward/ backward/ lateral 2x10 B ea   PLAN             CPT Codes available for Billing:   (0) minutes of Manual therapy (MT) to improve pain and ROM.  (0) minutes of Therapeutic Exercise (TE) to develop strength, endurance, range of " motion, and flexibility.  (15) minutes of Neuromuscular Re-Education (NMR)  to improve: Balance, Coordination, Kinesthetic, Sense, Proprioception, and Posture.  (0) minutes of Therapeutic Activities (TA) to improve functional performance.  Unattended Electrical Stimulation (ES) for muscle performance or pain modulation.  Vasopneumatic Device Therapy () for management of swelling/edema. (44457)  BFR: Blood flow restriction applied during exercise  NP or (-): Not Performed       Patient Education and Home Exercises       Home Exercises Provided and Patient Education Provided     Education provided: (5) minutes  PURPOSE: Patient educated on the impairments noted above and the effects of physical therapy intervention to improve overall condition and QOL.   EXERCISE: Patient was educated on all the above exercise prior/during/after for proper posture, positioning, and execution for safe performance with home exercise program.   GAIT & BALANCE: Patient educated on the importance of strong core and lower extremity musculature in order to improve both static and dynamic balance, improve gait mechanics, reduce fall risk and improve household and community mobility.     Written Home Exercises Provided: yes.  Exercises were reviewed and Rama was able to demonstrate them prior to the end of the session.  Rama demonstrated good  understanding of the education provided. See EMR under Patient Instructions for exercises provided during therapy sessions.    Assessment     Patient tolerated today's session with minimal complaints of pain. Patient displayed bilateral lower extremity weakness with multiple rest breaks due to deconditioning. Patient had lateral gait ataxia when adding head turns both vertical and horizontally. Today we also worked on ankle and stepping strategies to improve reaction when having loss of balance. Patient will continue to benefit from.    Rama is progressing well towards her goals.   Pt prognosis is  Excellent.     Pt will continue to benefit from skilled outpatient physical therapy to address the deficits listed in the problem list box on initial evaluation, provide pt/family education and to maximize pt's level of independence in the home and community environment.     Pt's spiritual, cultural and educational needs considered and pt agreeable to plan of care and goals.     Anticipated Barriers for therapy: none    Short Term Goals:  6 weeks Status  Date Met   PAIN: Pt will report worst pain of 5/10 in order to progress toward max functional ability and improve quality of life. [x] Progressing  [] Met  [] Not Met     FUNCTION: Patient will demonstrate improved function as indicated by a score of greater than or equal to 45 out of 100 on FOTO. [x] Progressing  [] Met  [] Not Met     MOBILITY: Patient will improve AROM to 50% of stated goals, listed in objective measures above, in order to progress towards independence with functional activities.  [x] Progressing  [] Met  [] Not Met     STRENGTH: Patient will improve strength to 50% of stated goals, listed in objective measures above, in order to progress towards independence with functional activities. [x] Progressing  [] Met  [] Not Met     POSTURE: Patient will correct postural deviations in sitting and standing, to decrease pain and promote long term stability.  [x] Progressing  [] Met  [] Not Met     GAIT: Patient will demonstrate improved gait mechanics including increased knee flexon on left in order to improve functional mobility, improve quality of life, and decrease risk of further injury or fall.  [x] Progressing  [] Met  [] Not Met     HEP: Patient will demonstrate independence with HEP in order to progress toward functional independence. [x] Progressing  [] Met  [] Not Met        Long Term Goals:  12 weeks Status Date Met   PAIN: Pt will report worst pain of 1/10 in order to progress toward max functional ability and improve quality of life [x]  Progressing  [] Met  [] Not Met     FUNCTION: Patient will demonstrate improved function as indicated by a score of greater than or equal to 52 out of 100 on FOTO. [x] Progressing  [] Met  [] Not Met     MOBILITY: Patient will improve AROM to stated goals, listed in objective measures above, in order to return to maximal functional potential and improve quality of life.  [x] Progressing  [] Met  [] Not Met     STRENGTH: Patient will improve strength to stated goals, listed in objective measures above, in order to improve functional independence and quality of life.  [x] Progressing  [] Met  [] Not Met     GAIT: Patient will demonstrate normalized gait mechanics with minimal compensation in order to return to PLOF. [x] Progressing  [] Met  [] Not Met     Patient will return to normal ADL's, IADL's, community involvement, recreational activities, and work-related activities with less than or equal to 1/10 pain and maximal function.  [x] Progressing  [] Met  [] Not Met     Patient to be able to walk without gait devation while performing head turns. [x] Progressing  [] Met  [] Not Met     Patient to be able to perform 30 seconds in each section of mCTSIB [x] Progressing  [] Met  [] Not Met         Plan     Continue Plan of Care (POC) and progress per patient tolerance. See treatment section for details on planned progressions next session.      Shekhar Johnson, PT

## 2024-07-02 ENCOUNTER — CLINICAL SUPPORT (OUTPATIENT)
Dept: REHABILITATION | Facility: HOSPITAL | Age: 59
End: 2024-07-02
Payer: COMMERCIAL

## 2024-07-02 DIAGNOSIS — M53.86 DECREASED ROM OF INTERVERTEBRAL DISCS OF LUMBAR SPINE: ICD-10-CM

## 2024-07-02 DIAGNOSIS — R29.6 RECURRENT FALLS: Primary | ICD-10-CM

## 2024-07-02 DIAGNOSIS — R53.1 GENERAL WEAKNESS: ICD-10-CM

## 2024-07-02 PROCEDURE — 97110 THERAPEUTIC EXERCISES: CPT | Mod: PN

## 2024-07-02 PROCEDURE — 97112 NEUROMUSCULAR REEDUCATION: CPT | Mod: PN

## 2024-07-02 NOTE — PROGRESS NOTES
"[facility]   Physical Therapy Treatment Note        Name: Rama Bravo  Clinic Number: [id number]    Therapy Diagnosis:   Encounter Diagnoses   Name Primary?    Recurrent falls Yes    Decreased ROM of intervertebral discs of lumbar spine     General weakness      Physician: Taye Bello*    Visit Date: 7/2/2024    Physician Orders: PT Eval and Treat  Medical Diagnosis from Referral: M54.41,G89.29 (ICD-10-CM) - Chronic bilateral low back pain with right-sided sciatica R29.6 (ICD-10-CM) - Recurrent falls  Evaluation Date: 6/7/2024  Authorization Period Expiration: 09/17/2025  Plan of Care Expiration: 9/7/2024  Progress Note Due: 7/7/2024  Visit # / Visits authorized: 3/20+eval   FOTO: 1/3 (last performed on 6/7/2024)     Precautions: Standard  PTA Visit #: 0/5     Time In: 1330  Time Out: 1430  Total Billable Time: 55 minutes (Billing reflects 1 on 1 treatment time spent with patient)    Subjective     Patient reports: leg are sore but have been feeling stronger than they were the last two weeks.    He/She was compliant with home exercise program.  Response to previous treatment: no notable change  Functional change: no notable change    Pain: 6/10     Location: lower back    Objective      Objective Measures updated at progress report or POC update only unless otherwise noted.       Treatment     Rama received the treatments listed below:     CPT Intervention  JointFocus Duration / Intensity  7/2   TE Bike 6'   TE Shuttle press DL 6B 3', SL 2B 2x10 B    TE Knee ext DL 15# 3x10   TE  HS curl DL 25# 3x10   NMR Lateral band walks [place] versa cuff 3 laps    NMR Walking /c head turns left-right/up-down 2 laps ea   NMR Walking /c ball toss 2 laps   NMR Walking /c pertibations  2 laps    NMR Step backs -    NMR Step forward -    NMR  NBOS on foam EC 30" 2x   EO ball toss 1' 2x    NMR Stepping strategies -   PLAN             CPT Codes available for Billing:   (0) minutes of Manual " therapy (MT) to improve pain and ROM.  (25) minutes of Therapeutic Exercise (TE) to develop strength, endurance, range of motion, and flexibility.  (30) minutes of Neuromuscular Re-Education (NMR)  to improve: Balance, Coordination, Kinesthetic, Sense, Proprioception, and Posture.  (0) minutes of Therapeutic Activities (TA) to improve functional performance.  Unattended Electrical Stimulation (ES) for muscle performance or pain modulation.  Vasopneumatic Device Therapy () for management of swelling/edema. (98712)  BFR: Blood flow restriction applied during exercise  NP or (-): Not Performed       Patient Education and Home Exercises       Home Exercises Provided and Patient Education Provided     Education provided: (5) minutes  PURPOSE: Patient educated on the impairments noted above and the effects of physical therapy intervention to improve overall condition and QOL.   EXERCISE: Patient was educated on all the above exercise prior/during/after for proper posture, positioning, and execution for safe performance with home exercise program.   GAIT & BALANCE: Patient educated on the importance of strong core and lower extremity musculature in order to improve both static and dynamic balance, improve gait mechanics, reduce fall risk and improve household and community mobility.     Written Home Exercises Provided: yes.  Exercises were reviewed and Rama was able to demonstrate them prior to the end of the session.  Rama demonstrated good  understanding of the education provided. See EMR under Patient Instructions for exercises provided during therapy sessions.    Assessment     Patient tolerated today's session with minimal complaints of pain. Wobbling with head turns both vertically and horizontally and while standing on the phone. Patient will continue to benefit from care to improve balance and lower extremity strength.    Rama is progressing well towards her goals.   Pt prognosis is Excellent.     Pt will  continue to benefit from skilled outpatient physical therapy to address the deficits listed in the problem list box on initial evaluation, provide pt/family education and to maximize pt's level of independence in the home and community environment.     Pt's spiritual, cultural and educational needs considered and pt agreeable to plan of care and goals.     Anticipated Barriers for therapy: none    Short Term Goals:  6 weeks Status  Date Met   PAIN: Pt will report worst pain of 5/10 in order to progress toward max functional ability and improve quality of life. [x] Progressing  [] Met  [] Not Met     FUNCTION: Patient will demonstrate improved function as indicated by a score of greater than or equal to 45 out of 100 on FOTO. [x] Progressing  [] Met  [] Not Met     MOBILITY: Patient will improve AROM to 50% of stated goals, listed in objective measures above, in order to progress towards independence with functional activities.  [x] Progressing  [] Met  [] Not Met     STRENGTH: Patient will improve strength to 50% of stated goals, listed in objective measures above, in order to progress towards independence with functional activities. [x] Progressing  [] Met  [] Not Met     POSTURE: Patient will correct postural deviations in sitting and standing, to decrease pain and promote long term stability.  [x] Progressing  [] Met  [] Not Met     GAIT: Patient will demonstrate improved gait mechanics including increased knee flexon on left in order to improve functional mobility, improve quality of life, and decrease risk of further injury or fall.  [x] Progressing  [] Met  [] Not Met     HEP: Patient will demonstrate independence with HEP in order to progress toward functional independence. [x] Progressing  [] Met  [] Not Met        Long Term Goals:  12 weeks Status Date Met   PAIN: Pt will report worst pain of 1/10 in order to progress toward max functional ability and improve quality of life [x] Progressing  [] Met  [] Not  Met     FUNCTION: Patient will demonstrate improved function as indicated by a score of greater than or equal to 52 out of 100 on FOTO. [x] Progressing  [] Met  [] Not Met     MOBILITY: Patient will improve AROM to stated goals, listed in objective measures above, in order to return to maximal functional potential and improve quality of life.  [x] Progressing  [] Met  [] Not Met     STRENGTH: Patient will improve strength to stated goals, listed in objective measures above, in order to improve functional independence and quality of life.  [x] Progressing  [] Met  [] Not Met     GAIT: Patient will demonstrate normalized gait mechanics with minimal compensation in order to return to PLOF. [x] Progressing  [] Met  [] Not Met     Patient will return to normal ADL's, IADL's, community involvement, recreational activities, and work-related activities with less than or equal to 1/10 pain and maximal function.  [x] Progressing  [] Met  [] Not Met     Patient to be able to walk without gait devation while performing head turns. [x] Progressing  [] Met  [] Not Met     Patient to be able to perform 30 seconds in each section of mCTSIB [x] Progressing  [] Met  [] Not Met         Plan     Continue Plan of Care (POC) and progress per patient tolerance. See treatment section for details on planned progressions next session.      Shekhar Johnson, PT

## 2024-07-05 ENCOUNTER — TELEPHONE (OUTPATIENT)
Dept: REHABILITATION | Facility: HOSPITAL | Age: 59
End: 2024-07-05
Payer: COMMERCIAL

## 2024-07-05 NOTE — TELEPHONE ENCOUNTER
Left voicemail providing patient with call back number secondary to no show and no more scheduled visits

## 2024-08-12 ENCOUNTER — TELEPHONE (OUTPATIENT)
Dept: NEUROLOGY | Facility: CLINIC | Age: 59
End: 2024-08-12
Payer: COMMERCIAL

## 2024-11-05 ENCOUNTER — PATIENT MESSAGE (OUTPATIENT)
Dept: NEUROLOGY | Facility: CLINIC | Age: 59
End: 2024-11-05
Payer: COMMERCIAL

## 2024-11-05 ENCOUNTER — TELEPHONE (OUTPATIENT)
Dept: NEUROLOGY | Facility: CLINIC | Age: 59
End: 2024-11-05
Payer: COMMERCIAL

## 2025-06-20 ENCOUNTER — HOSPITAL ENCOUNTER (INPATIENT)
Facility: HOSPITAL | Age: 60
LOS: 4 days | Discharge: HOME | DRG: 392 | End: 2025-06-24
Attending: FAMILY MEDICINE | Admitting: FAMILY MEDICINE
Payer: COMMERCIAL

## 2025-06-20 DIAGNOSIS — E87.6 HYPOKALEMIA: ICD-10-CM

## 2025-06-20 DIAGNOSIS — R11.2 INTRACTABLE NAUSEA AND VOMITING: ICD-10-CM

## 2025-06-20 DIAGNOSIS — R10.9 ABDOMINAL PAIN, UNSPECIFIED ABDOMINAL LOCATION: ICD-10-CM

## 2025-06-20 DIAGNOSIS — D64.9 ANEMIA, UNSPECIFIED TYPE: ICD-10-CM

## 2025-06-20 DIAGNOSIS — R53.1 WEAKNESS: ICD-10-CM

## 2025-06-20 DIAGNOSIS — E87.1 HYPONATREMIA: ICD-10-CM

## 2025-06-20 DIAGNOSIS — A41.9 SEPSIS, DUE TO UNSPECIFIED ORGANISM, UNSPECIFIED WHETHER ACUTE ORGAN DYSFUNCTION PRESENT: Primary | ICD-10-CM

## 2025-06-20 DIAGNOSIS — R07.9 CHEST PAIN: ICD-10-CM

## 2025-06-20 DIAGNOSIS — R00.0 TACHYCARDIA: ICD-10-CM

## 2025-06-20 PROBLEM — I10 HTN (HYPERTENSION): Status: ACTIVE | Noted: 2025-06-20

## 2025-06-20 PROBLEM — K59.00 CONSTIPATION: Status: ACTIVE | Noted: 2025-06-20

## 2025-06-20 PROBLEM — D72.829 LEUKOCYTOSIS: Status: ACTIVE | Noted: 2025-06-20

## 2025-06-20 PROBLEM — E78.5 HLD (HYPERLIPIDEMIA): Status: ACTIVE | Noted: 2025-06-20

## 2025-06-20 PROBLEM — G89.29 CHRONIC PAIN: Status: ACTIVE | Noted: 2025-06-20

## 2025-06-20 LAB
ABORH RETYPE: NORMAL
ABSOLUTE EOSINOPHIL (OHS): 0.46 K/UL
ABSOLUTE MONOCYTE (OHS): 1.62 K/UL (ref 0.3–1)
ABSOLUTE NEUTROPHIL COUNT (OHS): 17.24 K/UL (ref 1.8–7.7)
ALBUMIN SERPL BCP-MCNC: 3.6 G/DL (ref 3.5–5.2)
ALP SERPL-CCNC: 74 UNIT/L (ref 40–150)
ALT SERPL W/O P-5'-P-CCNC: 17 UNIT/L (ref 10–44)
AMPHET UR QL SCN: NEGATIVE
ANION GAP (OHS): 14 MMOL/L (ref 8–16)
AST SERPL-CCNC: 16 UNIT/L (ref 11–45)
BARBITURATE SCN PRESENT UR: NEGATIVE
BASOPHILS # BLD AUTO: 0.05 K/UL
BASOPHILS NFR BLD AUTO: 0.2 %
BENZODIAZ UR QL SCN: NEGATIVE
BILIRUB SERPL-MCNC: 0.6 MG/DL (ref 0.1–1)
BILIRUB UR QL STRIP.AUTO: NEGATIVE
BNP SERPL-MCNC: 19 PG/ML (ref 0–99)
BUN SERPL-MCNC: 35 MG/DL (ref 8–23)
CALCIUM SERPL-MCNC: 9.7 MG/DL (ref 8.7–10.5)
CANNABINOIDS UR QL SCN: NEGATIVE
CHLORIDE SERPL-SCNC: 91 MMOL/L (ref 95–110)
CLARITY UR: CLEAR
CO2 SERPL-SCNC: 22 MMOL/L (ref 23–29)
COCAINE UR QL SCN: NEGATIVE
COLOR UR AUTO: YELLOW
CREAT SERPL-MCNC: 0.8 MG/DL (ref 0.5–1.4)
CREAT UR-MCNC: 64 MG/DL (ref 15–325)
ERYTHROCYTE [DISTWIDTH] IN BLOOD BY AUTOMATED COUNT: 12 % (ref 11.5–14.5)
FERRITIN SERPL-MCNC: 45.2 NG/ML (ref 20–300)
GFR SERPLBLD CREATININE-BSD FMLA CKD-EPI: >60 ML/MIN/1.73/M2
GLUCOSE SERPL-MCNC: 119 MG/DL (ref 70–110)
GLUCOSE UR QL STRIP: NEGATIVE
HCT VFR BLD AUTO: 24.7 % (ref 37–48.5)
HCV AB SERPL QL IA: NEGATIVE
HGB BLD-MCNC: 8.9 GM/DL (ref 12–16)
HGB UR QL STRIP: NEGATIVE
HIV 1+2 AB+HIV1 P24 AG SERPL QL IA: NEGATIVE
HOLD SPECIMEN: NORMAL
HOLD SPECIMEN: NORMAL
IMM GRANULOCYTES # BLD AUTO: 0.24 K/UL (ref 0–0.04)
IMM GRANULOCYTES NFR BLD AUTO: 1 % (ref 0–0.5)
INDIRECT COOMBS: NORMAL
INFLUENZA A MOLECULAR (OHS): NEGATIVE
INFLUENZA B MOLECULAR (OHS): NEGATIVE
KETONES UR QL STRIP: NEGATIVE
LACTATE SERPL-SCNC: 1 MMOL/L (ref 0.5–2.2)
LEUKOCYTE ESTERASE UR QL STRIP: NEGATIVE
LYMPHOCYTES # BLD AUTO: 3.4 K/UL (ref 1–4.8)
MCH RBC QN AUTO: 32.2 PG (ref 27–31)
MCHC RBC AUTO-ENTMCNC: 36 G/DL (ref 32–36)
MCV RBC AUTO: 90 FL (ref 82–98)
METHADONE UR QL SCN: NEGATIVE
NITRITE UR QL STRIP: NEGATIVE
NUCLEATED RBC (/100WBC) (OHS): 0 /100 WBC
OB PNL STL: NEGATIVE
OPIATES UR QL SCN: ABNORMAL
PCP UR QL: NEGATIVE
PH UR STRIP: 6 [PH]
PLATELET # BLD AUTO: 381 K/UL (ref 150–450)
PMV BLD AUTO: 10.3 FL (ref 9.2–12.9)
POTASSIUM SERPL-SCNC: 2.4 MMOL/L (ref 3.5–5.1)
PROCALCITONIN SERPL-MCNC: 0.04 NG/ML
PROT SERPL-MCNC: 6.9 GM/DL (ref 6–8.4)
PROT UR QL STRIP: NEGATIVE
RBC # BLD AUTO: 2.76 M/UL (ref 4–5.4)
RELATIVE EOSINOPHIL (OHS): 2 %
RELATIVE LYMPHOCYTE (OHS): 14.8 % (ref 18–48)
RELATIVE MONOCYTE (OHS): 7 % (ref 4–15)
RELATIVE NEUTROPHIL (OHS): 75 % (ref 38–73)
RH BLD: NORMAL
SARS-COV-2 RDRP RESP QL NAA+PROBE: NEGATIVE
SODIUM SERPL-SCNC: 127 MMOL/L (ref 136–145)
SP GR UR STRIP: 1.01
SPECIMEN OUTDATE: NORMAL
T4 FREE SERPL-MCNC: 1.11 NG/DL (ref 0.71–1.51)
T4 FREE SERPL-MCNC: 1.11 NG/DL (ref 0.71–1.51)
TROPONIN I SERPL DL<=0.01 NG/ML-MCNC: <0.006 NG/ML
TSH SERPL-ACNC: 1.14 UIU/ML (ref 0.4–4)
UROBILINOGEN UR STRIP-ACNC: NEGATIVE EU/DL
WBC # BLD AUTO: 23.01 K/UL (ref 3.9–12.7)

## 2025-06-20 PROCEDURE — 84439 ASSAY OF FREE THYROXINE: CPT | Performed by: NURSE PRACTITIONER

## 2025-06-20 PROCEDURE — 36415 COLL VENOUS BLD VENIPUNCTURE: CPT | Performed by: NURSE PRACTITIONER

## 2025-06-20 PROCEDURE — 82272 OCCULT BLD FECES 1-3 TESTS: CPT | Performed by: FAMILY MEDICINE

## 2025-06-20 PROCEDURE — 63600175 PHARM REV CODE 636 W HCPCS: Performed by: FAMILY MEDICINE

## 2025-06-20 PROCEDURE — 87040 BLOOD CULTURE FOR BACTERIA: CPT | Performed by: FAMILY MEDICINE

## 2025-06-20 PROCEDURE — 87502 INFLUENZA DNA AMP PROBE: CPT | Performed by: NURSE PRACTITIONER

## 2025-06-20 PROCEDURE — 83605 ASSAY OF LACTIC ACID: CPT | Performed by: FAMILY MEDICINE

## 2025-06-20 PROCEDURE — 93005 ELECTROCARDIOGRAM TRACING: CPT

## 2025-06-20 PROCEDURE — 84484 ASSAY OF TROPONIN QUANT: CPT | Performed by: NURSE PRACTITIONER

## 2025-06-20 PROCEDURE — 82746 ASSAY OF FOLIC ACID SERUM: CPT | Performed by: NURSE PRACTITIONER

## 2025-06-20 PROCEDURE — 63600175 PHARM REV CODE 636 W HCPCS: Performed by: NURSE PRACTITIONER

## 2025-06-20 PROCEDURE — 25000003 PHARM REV CODE 250: Performed by: FAMILY MEDICINE

## 2025-06-20 PROCEDURE — 96374 THER/PROPH/DIAG INJ IV PUSH: CPT

## 2025-06-20 PROCEDURE — 82728 ASSAY OF FERRITIN: CPT | Performed by: NURSE PRACTITIONER

## 2025-06-20 PROCEDURE — 86901 BLOOD TYPING SEROLOGIC RH(D): CPT | Performed by: FAMILY MEDICINE

## 2025-06-20 PROCEDURE — 25000003 PHARM REV CODE 250: Performed by: NURSE PRACTITIONER

## 2025-06-20 PROCEDURE — 81003 URINALYSIS AUTO W/O SCOPE: CPT | Performed by: NURSE PRACTITIONER

## 2025-06-20 PROCEDURE — 83880 ASSAY OF NATRIURETIC PEPTIDE: CPT | Performed by: NURSE PRACTITIONER

## 2025-06-20 PROCEDURE — 86803 HEPATITIS C AB TEST: CPT | Performed by: FAMILY MEDICINE

## 2025-06-20 PROCEDURE — 84466 ASSAY OF TRANSFERRIN: CPT | Performed by: NURSE PRACTITIONER

## 2025-06-20 PROCEDURE — 84145 PROCALCITONIN (PCT): CPT | Performed by: FAMILY MEDICINE

## 2025-06-20 PROCEDURE — 82607 VITAMIN B-12: CPT | Performed by: NURSE PRACTITIONER

## 2025-06-20 PROCEDURE — 99285 EMERGENCY DEPT VISIT HI MDM: CPT | Mod: 25

## 2025-06-20 PROCEDURE — 80307 DRUG TEST PRSMV CHEM ANLYZR: CPT | Performed by: NURSE PRACTITIONER

## 2025-06-20 PROCEDURE — U0002 COVID-19 LAB TEST NON-CDC: HCPCS | Performed by: NURSE PRACTITIONER

## 2025-06-20 PROCEDURE — 21400001 HC TELEMETRY ROOM

## 2025-06-20 PROCEDURE — 94761 N-INVAS EAR/PLS OXIMETRY MLT: CPT

## 2025-06-20 PROCEDURE — 25500020 PHARM REV CODE 255: Performed by: FAMILY MEDICINE

## 2025-06-20 PROCEDURE — 93010 ELECTROCARDIOGRAM REPORT: CPT | Mod: ,,, | Performed by: INTERNAL MEDICINE

## 2025-06-20 PROCEDURE — 87389 HIV-1 AG W/HIV-1&-2 AB AG IA: CPT | Performed by: FAMILY MEDICINE

## 2025-06-20 PROCEDURE — 84075 ASSAY ALKALINE PHOSPHATASE: CPT | Performed by: NURSE PRACTITIONER

## 2025-06-20 PROCEDURE — 85025 COMPLETE CBC W/AUTO DIFF WBC: CPT | Performed by: NURSE PRACTITIONER

## 2025-06-20 RX ORDER — SIMETHICONE 80 MG
1 TABLET,CHEWABLE ORAL 4 TIMES DAILY PRN
Status: DISCONTINUED | OUTPATIENT
Start: 2025-06-20 | End: 2025-06-24 | Stop reason: HOSPADM

## 2025-06-20 RX ORDER — IPRATROPIUM BROMIDE AND ALBUTEROL SULFATE 2.5; .5 MG/3ML; MG/3ML
3 SOLUTION RESPIRATORY (INHALATION) EVERY 6 HOURS PRN
Status: DISCONTINUED | OUTPATIENT
Start: 2025-06-20 | End: 2025-06-24 | Stop reason: HOSPADM

## 2025-06-20 RX ORDER — GLUCAGON 1 MG
1 KIT INJECTION
Status: DISCONTINUED | OUTPATIENT
Start: 2025-06-20 | End: 2025-06-24 | Stop reason: HOSPADM

## 2025-06-20 RX ORDER — ENOXAPARIN SODIUM 100 MG/ML
40 INJECTION SUBCUTANEOUS EVERY 24 HOURS
Status: DISCONTINUED | OUTPATIENT
Start: 2025-06-20 | End: 2025-06-21

## 2025-06-20 RX ORDER — PROMETHAZINE HYDROCHLORIDE 25 MG/1
25 TABLET ORAL EVERY 6 HOURS PRN
COMMUNITY

## 2025-06-20 RX ORDER — CETIRIZINE HYDROCHLORIDE 10 MG/1
10 TABLET ORAL DAILY
COMMUNITY

## 2025-06-20 RX ORDER — IBUPROFEN 200 MG
24 TABLET ORAL
Status: DISCONTINUED | OUTPATIENT
Start: 2025-06-20 | End: 2025-06-24 | Stop reason: HOSPADM

## 2025-06-20 RX ORDER — CETIRIZINE HYDROCHLORIDE 10 MG/1
10 TABLET ORAL NIGHTLY
Status: DISCONTINUED | OUTPATIENT
Start: 2025-06-20 | End: 2025-06-24 | Stop reason: HOSPADM

## 2025-06-20 RX ORDER — POLYETHYLENE GLYCOL 3350 17 G/17G
17 POWDER, FOR SOLUTION ORAL 2 TIMES DAILY
Status: DISCONTINUED | OUTPATIENT
Start: 2025-06-20 | End: 2025-06-20

## 2025-06-20 RX ORDER — HYDROCODONE BITARTRATE AND ACETAMINOPHEN 10; 325 MG/1; MG/1
1 TABLET ORAL 2 TIMES DAILY
COMMUNITY
Start: 2025-05-28

## 2025-06-20 RX ORDER — ONDANSETRON HYDROCHLORIDE 2 MG/ML
4 INJECTION, SOLUTION INTRAVENOUS EVERY 6 HOURS PRN
Status: DISCONTINUED | OUTPATIENT
Start: 2025-06-20 | End: 2025-06-24 | Stop reason: HOSPADM

## 2025-06-20 RX ORDER — SODIUM CHLORIDE 9 MG/ML
INJECTION, SOLUTION INTRAVENOUS CONTINUOUS
Status: DISCONTINUED | OUTPATIENT
Start: 2025-06-20 | End: 2025-06-22

## 2025-06-20 RX ORDER — AMOXICILLIN 250 MG
1 CAPSULE ORAL 2 TIMES DAILY
Status: DISCONTINUED | OUTPATIENT
Start: 2025-06-20 | End: 2025-06-22

## 2025-06-20 RX ORDER — GABAPENTIN 300 MG/1
300 CAPSULE ORAL 2 TIMES DAILY
COMMUNITY
Start: 2025-05-20 | End: 2025-11-16

## 2025-06-20 RX ORDER — TALC
6 POWDER (GRAM) TOPICAL NIGHTLY PRN
Status: DISCONTINUED | OUTPATIENT
Start: 2025-06-20 | End: 2025-06-24 | Stop reason: HOSPADM

## 2025-06-20 RX ORDER — ALUMINUM HYDROXIDE, MAGNESIUM HYDROXIDE, AND SIMETHICONE 1200; 120; 1200 MG/30ML; MG/30ML; MG/30ML
30 SUSPENSION ORAL 4 TIMES DAILY PRN
Status: DISCONTINUED | OUTPATIENT
Start: 2025-06-20 | End: 2025-06-24 | Stop reason: HOSPADM

## 2025-06-20 RX ORDER — ACETAMINOPHEN 325 MG/1
650 TABLET ORAL EVERY 4 HOURS PRN
Status: DISCONTINUED | OUTPATIENT
Start: 2025-06-20 | End: 2025-06-24 | Stop reason: HOSPADM

## 2025-06-20 RX ORDER — HYDROCODONE BITARTRATE AND ACETAMINOPHEN 10; 325 MG/1; MG/1
1 TABLET ORAL EVERY 8 HOURS PRN
Refills: 0 | Status: DISCONTINUED | OUTPATIENT
Start: 2025-06-20 | End: 2025-06-24 | Stop reason: HOSPADM

## 2025-06-20 RX ORDER — ERGOCALCIFEROL 1.25 MG/1
50000 CAPSULE ORAL
COMMUNITY

## 2025-06-20 RX ORDER — POTASSIUM CHLORIDE 20 MEQ/1
40 TABLET, EXTENDED RELEASE ORAL ONCE
Status: DISCONTINUED | OUTPATIENT
Start: 2025-06-20 | End: 2025-06-20

## 2025-06-20 RX ORDER — ESCITALOPRAM OXALATE 10 MG/1
10 TABLET ORAL DAILY
Status: DISCONTINUED | OUTPATIENT
Start: 2025-06-21 | End: 2025-06-24 | Stop reason: HOSPADM

## 2025-06-20 RX ORDER — HYDROCHLOROTHIAZIDE 50 MG/1
50 TABLET ORAL DAILY
COMMUNITY

## 2025-06-20 RX ORDER — SODIUM CHLORIDE 0.9 % (FLUSH) 0.9 %
10 SYRINGE (ML) INJECTION EVERY 8 HOURS PRN
Status: DISCONTINUED | OUTPATIENT
Start: 2025-06-20 | End: 2025-06-24 | Stop reason: HOSPADM

## 2025-06-20 RX ORDER — FAMOTIDINE 20 MG/1
20 TABLET, FILM COATED ORAL 2 TIMES DAILY
Status: DISCONTINUED | OUTPATIENT
Start: 2025-06-20 | End: 2025-06-22

## 2025-06-20 RX ORDER — ATORVASTATIN CALCIUM 40 MG/1
40 TABLET, FILM COATED ORAL NIGHTLY
Status: DISCONTINUED | OUTPATIENT
Start: 2025-06-20 | End: 2025-06-24 | Stop reason: HOSPADM

## 2025-06-20 RX ORDER — HYDROCHLOROTHIAZIDE 25 MG/1
120 TABLET ORAL 2 TIMES DAILY
COMMUNITY

## 2025-06-20 RX ORDER — AMOXICILLIN 250 MG
1 CAPSULE ORAL 2 TIMES DAILY
Status: DISCONTINUED | OUTPATIENT
Start: 2025-06-20 | End: 2025-06-20

## 2025-06-20 RX ORDER — NALOXONE HCL 0.4 MG/ML
0.02 VIAL (ML) INJECTION
Status: DISCONTINUED | OUTPATIENT
Start: 2025-06-20 | End: 2025-06-24 | Stop reason: HOSPADM

## 2025-06-20 RX ORDER — SODIUM CHLORIDE 9 MG/ML
1000 INJECTION, SOLUTION INTRAVENOUS
Status: COMPLETED | OUTPATIENT
Start: 2025-06-20 | End: 2025-06-20

## 2025-06-20 RX ORDER — POLYETHYLENE GLYCOL 3350 17 G/17G
17 POWDER, FOR SOLUTION ORAL 2 TIMES DAILY
Status: DISCONTINUED | OUTPATIENT
Start: 2025-06-20 | End: 2025-06-22

## 2025-06-20 RX ORDER — HYDROCHLOROTHIAZIDE 25 MG/1
120 TABLET ORAL 2 TIMES DAILY
Status: DISCONTINUED | OUTPATIENT
Start: 2025-06-20 | End: 2025-06-24 | Stop reason: HOSPADM

## 2025-06-20 RX ORDER — PROCHLORPERAZINE EDISYLATE 5 MG/ML
5 INJECTION INTRAMUSCULAR; INTRAVENOUS EVERY 6 HOURS PRN
Status: DISCONTINUED | OUTPATIENT
Start: 2025-06-20 | End: 2025-06-24 | Stop reason: HOSPADM

## 2025-06-20 RX ORDER — BISACODYL 5 MG
10 TABLET, DELAYED RELEASE (ENTERIC COATED) ORAL ONCE
Status: COMPLETED | OUTPATIENT
Start: 2025-06-20 | End: 2025-06-20

## 2025-06-20 RX ORDER — POTASSIUM CHLORIDE 20 MEQ/1
40 TABLET, EXTENDED RELEASE ORAL EVERY 4 HOURS
Status: COMPLETED | OUTPATIENT
Start: 2025-06-20 | End: 2025-06-20

## 2025-06-20 RX ORDER — HYDROCODONE BITARTRATE AND ACETAMINOPHEN 10; 325 MG/1; MG/1
1 TABLET ORAL 2 TIMES DAILY
Refills: 0 | Status: DISCONTINUED | OUTPATIENT
Start: 2025-06-20 | End: 2025-06-20

## 2025-06-20 RX ORDER — IBUPROFEN 200 MG
16 TABLET ORAL
Status: DISCONTINUED | OUTPATIENT
Start: 2025-06-20 | End: 2025-06-24 | Stop reason: HOSPADM

## 2025-06-20 RX ORDER — POTASSIUM CHLORIDE 7.45 MG/ML
10 INJECTION INTRAVENOUS
Status: COMPLETED | OUTPATIENT
Start: 2025-06-20 | End: 2025-06-20

## 2025-06-20 RX ORDER — PANTOPRAZOLE SODIUM 40 MG/1
40 TABLET, DELAYED RELEASE ORAL 2 TIMES DAILY
Status: DISCONTINUED | OUTPATIENT
Start: 2025-06-20 | End: 2025-06-20

## 2025-06-20 RX ORDER — ONDANSETRON HYDROCHLORIDE 2 MG/ML
4 INJECTION, SOLUTION INTRAVENOUS
Status: COMPLETED | OUTPATIENT
Start: 2025-06-20 | End: 2025-06-20

## 2025-06-20 RX ORDER — GABAPENTIN 300 MG/1
300 CAPSULE ORAL 2 TIMES DAILY
Status: DISCONTINUED | OUTPATIENT
Start: 2025-06-20 | End: 2025-06-24 | Stop reason: HOSPADM

## 2025-06-20 RX ORDER — BISACODYL 5 MG
10 TABLET, DELAYED RELEASE (ENTERIC COATED) ORAL ONCE
Status: DISCONTINUED | OUTPATIENT
Start: 2025-06-20 | End: 2025-06-20

## 2025-06-20 RX ORDER — ATORVASTATIN CALCIUM 40 MG/1
40 TABLET, FILM COATED ORAL NIGHTLY
COMMUNITY

## 2025-06-20 RX ADMIN — FAMOTIDINE 20 MG: 20 TABLET ORAL at 08:06

## 2025-06-20 RX ADMIN — ONDANSETRON 4 MG: 2 INJECTION INTRAMUSCULAR; INTRAVENOUS at 11:06

## 2025-06-20 RX ADMIN — SENNOSIDES AND DOCUSATE SODIUM 1 TABLET: 50; 8.6 TABLET ORAL at 05:06

## 2025-06-20 RX ADMIN — POTASSIUM CHLORIDE 40 MEQ: 1500 TABLET, EXTENDED RELEASE ORAL at 09:06

## 2025-06-20 RX ADMIN — IOHEXOL 100 ML: 350 INJECTION, SOLUTION INTRAVENOUS at 01:06

## 2025-06-20 RX ADMIN — BISACODYL 10 MG: 5 TABLET, COATED ORAL at 05:06

## 2025-06-20 RX ADMIN — ENOXAPARIN SODIUM 40 MG: 40 INJECTION SUBCUTANEOUS at 05:06

## 2025-06-20 RX ADMIN — GABAPENTIN 300 MG: 300 CAPSULE ORAL at 08:06

## 2025-06-20 RX ADMIN — SODIUM CHLORIDE 1000 ML: 9 INJECTION, SOLUTION INTRAVENOUS at 11:06

## 2025-06-20 RX ADMIN — POTASSIUM CHLORIDE 40 MEQ: 1500 TABLET, EXTENDED RELEASE ORAL at 05:06

## 2025-06-20 RX ADMIN — POLYETHYLENE GLYCOL 3350 17 G: 17 POWDER, FOR SOLUTION ORAL at 05:06

## 2025-06-20 RX ADMIN — VERAPAMIL HYDROCHLORIDE 120 MG: 120 TABLET, FILM COATED, EXTENDED RELEASE ORAL at 08:06

## 2025-06-20 RX ADMIN — POTASSIUM CHLORIDE 10 MEQ: 7.46 INJECTION, SOLUTION INTRAVENOUS at 02:06

## 2025-06-20 RX ADMIN — CETIRIZINE HYDROCHLORIDE 10 MG: 10 TABLET, FILM COATED ORAL at 08:06

## 2025-06-20 RX ADMIN — SODIUM CHLORIDE: 9 INJECTION, SOLUTION INTRAVENOUS at 05:06

## 2025-06-20 RX ADMIN — PIPERACILLIN AND TAZOBACTAM 4.5 G: 4; .5 INJECTION, POWDER, LYOPHILIZED, FOR SOLUTION INTRAVENOUS; PARENTERAL at 02:06

## 2025-06-20 RX ADMIN — ATORVASTATIN CALCIUM 40 MG: 40 TABLET, FILM COATED ORAL at 08:06

## 2025-06-20 RX ADMIN — POTASSIUM CHLORIDE 40 MEQ: 1500 TABLET, EXTENDED RELEASE ORAL at 02:06

## 2025-06-20 NOTE — ED PROVIDER NOTES
SCRIBE #1 NOTE: IPuneetSowmya Darian am scribing for, and in the presence of, Negin Owen MD. I have scribed the entire note.       History     Chief Complaint   Patient presents with    Fatigue     Patient presents to ED with c/o dizziness, weakness and poor appetite x 1 week.     Review of patient's allergies indicates:   Allergen Reactions    Sulfamethoxazole-trimethoprim Nausea Only    Azithromycin     Sulfa (sulfonamide antibiotics)          History of Present Illness     HPI    6/20/2025, 12:54 PM  History obtained from the patient and medical records      History of Present Illness: Rama Bravo is a 60 y.o. female patient with a PMHx of hypertension, diabetes mellitus, and cancer who presents to the Emergency Department for evaluation of fatigue that started 1 week ago. Pt's sxs first began as nausea with vomiting. At this time, she was unable to take her medications and has not taken her medications since becoming sick. Pt no longer reports nausea and vomiting. She reports weakness in all extremities and feeling near-syncope upon standing/walking. Pt reports no previous abdominal surgeries. She states her  past colonoscopy reported no acute findings. Patient denies any fever or diarrhea. She does report light-headedness, intermittent abdominal pain, chills, and some black stool. Prior Tx includes phenergan.  No further complaints or concerns at this time.       Arrival mode: Personal Transportation    PCP: Bibiana, Primary Doctor        Past Medical History:  Past Medical History:   Diagnosis Date    Cancer     Diabetes mellitus     HPV (human papilloma virus) anogenital infection     Hypertension     Movement disorder        Past Surgical History:  Past Surgical History:   Procedure Laterality Date    adnoidectomy      CARPAL TUNNEL RELEASE      FOOT SURGERY      TONSILLECTOMY           Family History:  Family History   Problem Relation Name Age of Onset    Cancer Mother         Social History:  Social  History     Tobacco Use    Smoking status: Former     Types: Cigarettes    Smokeless tobacco: Never   Substance and Sexual Activity    Alcohol use: Yes    Drug use: Never    Sexual activity: Not Currently     Partners: Male        Review of Systems     Review of Systems   Constitutional:  Positive for chills and fatigue. Negative for fever.   HENT:  Negative for sore throat.    Respiratory:  Negative for shortness of breath.    Cardiovascular:  Negative for chest pain.   Gastrointestinal:  Positive for abdominal pain, blood in stool, nausea and vomiting. Negative for diarrhea.   Genitourinary:  Negative for dysuria.   Musculoskeletal:  Negative for back pain.   Skin:  Negative for rash.   Neurological:  Positive for weakness (generalized) and light-headedness.        (+) Near syncope   Hematological:  Does not bruise/bleed easily.   All other systems reviewed and are negative.     Physical Exam     Initial Vitals [06/20/25 1028]   BP Pulse Resp Temp SpO2   108/74 (!) 117 14 98.2 °F (36.8 °C) 100 %      MAP       --          Physical Exam  Nursing Notes and Vital Signs Reviewed.  Constitutional: Patient is in no acute distress. Ill-appearing.  Head: Atraumatic. Normocephalic.  Eyes: PERRL. EOM intact. Conjunctivae are not pale. No scleral icterus.  ENT: Mucous membranes are moist. Oropharynx is clear and symmetric.    Neck: Supple. Full ROM. No lymphadenopathy.  Cardiovascular: Tachycardic. Regular rhythm. Distal pulses are 2+ and symmetric. No murmurs, rubs, or gallops..  Pulmonary/Chest: No respiratory distress. Clear to auscultation bilaterally. No wheezing or rales.  Abdominal: Soft and non-distended. Diffuse periumbilical tenderness.  No rebound, guarding, or rigidity.  Genitourinary: No CVA tenderness.  Rectal: Normal rectal tone. No obvious melena or hematochezia.  Musculoskeletal: Moves all extremities. No obvious deformities.  Skin: Warm and dry. Pale.  Neurological:  Alert, awake, and appropriate.  Normal  "speech.  No acute focal neurological deficits are appreciated.  Psychiatric: Normal affect. Good eye contact. Appropriate in content.     ED Course     Critical Care    Date/Time: 6/20/2025 1:08 PM    Performed by: Negin Owen MD  Authorized by: Negin Owen MD  Direct patient critical care time: 20 minutes  Additional history critical care time: 15 minutes  Ordering / reviewing critical care time: 5 minutes  Documentation critical care time: 5 minutes  Consulting other physicians critical care time: 5 minutes  Total critical care time (exclusive of procedural time) : 50 minutes  Critical care time was exclusive of separately billable procedures and treating other patients and teaching time.  Critical care was necessary to treat or prevent imminent or life-threatening deterioration of the following conditions: sepsis (Hyponatremia and Hypokalemia).  Critical care was time spent personally by me on the following activities: blood draw for specimens, development of treatment plan with patient or surrogate, discussions with consultants, interpretation of cardiac output measurements, evaluation of patient's response to treatment, examination of patient, obtaining history from patient or surrogate, ordering and performing treatments and interventions, ordering and review of laboratory studies, ordering and review of radiographic studies, pulse oximetry, re-evaluation of patient's condition and review of old charts.        ED Vital Signs:  Vitals:    06/20/25 1028   BP: 108/74   Pulse: (!) 117   Resp: 14   Temp: 98.2 °F (36.8 °C)   TempSrc: Oral   SpO2: 100%   Height: 5' 4" (1.626 m)       Abnormal Lab Results:  Labs Reviewed   COMPREHENSIVE METABOLIC PANEL - Abnormal       Result Value    Sodium 127 (*)     Potassium 2.4 (*)     Chloride 91 (*)     CO2 22 (*)     Glucose 119 (*)     BUN 35 (*)     Creatinine 0.8      Calcium 9.7      Protein Total 6.9      Albumin 3.6      Bilirubin Total 0.6      ALP 74   "    AST 16      ALT 17      Anion Gap 14      eGFR >60     CBC WITH DIFFERENTIAL - Abnormal    WBC 23.01 (*)     RBC 2.76 (*)     HGB 8.9 (*)     HCT 24.7 (*)     MCV 90      MCH 32.2 (*)     MCHC 36.0      RDW 12.0      Platelet Count 381      MPV 10.3      Nucleated RBC 0      Neut % 75.0 (*)     Lymph % 14.8 (*)     Mono % 7.0      Eos % 2.0      Basophil % 0.2      Imm Grans % 1.0 (*)     Neut # 17.24 (*)     Lymph # 3.40      Mono # 1.62 (*)     Eos # 0.46      Baso # 0.05      Imm Grans # 0.24 (*)     Narrative:     Reviewed by technologist   INFLUENZA A & B BY MOLECULAR - Normal    INFLUENZA A MOLECULAR Negative      INFLUENZA B MOLECULAR  Negative     HEPATITIS C ANTIBODY - Normal    Hep C Ab Interp Negative     HIV 1 / 2 ANTIBODY - Normal    HIV 1/2 Ag/Ab Negative     URINALYSIS, REFLEX TO URINE CULTURE - Normal    Color, UA Yellow      Appearance, UA Clear      pH, UA 6.0      Spec Grav UA 1.015      Protein, UA Negative      Glucose, UA Negative      Ketones, UA Negative      Bilirubin, UA Negative      Blood, UA Negative      Nitrites, UA Negative      Urobilinogen, UA Negative      Leukocyte Esterase, UA Negative     TROPONIN I - Normal    Troponin-I <0.006     B-TYPE NATRIURETIC PEPTIDE - Normal    BNP 19     SARS-COV-2 RNA AMPLIFICATION, QUAL - Normal    SARS COV-2 Molecular Negative     OCCULT BLOOD X 1, STOOL - Normal    OCCULT BLOOD STOOL Negative     LACTIC ACID, PLASMA - Normal    Lactic Acid Level 1.0      Narrative:     Falsely low lactic acid results can be found in samples containing >=13.0 mg/dL total bilirubin and/or >=3.5 mg/dL direct bilirubin.    CULTURE, BLOOD   CULTURE, BLOOD   HEP C VIRUS HOLD SPECIMEN    Extra Tube Hold for add-ons.     CBC W/ AUTO DIFFERENTIAL    Narrative:     The following orders were created for panel order CBC auto differential.  Procedure                               Abnormality         Status                     ---------                                -----------         ------                     CBC with Differential[[id number]]       Abnormal            Final result                 Please view results for these tests on the individual orders.   GREY TOP URINE HOLD    Extra Tube Hold for add-ons.     LACTIC ACID, PLASMA   PROCALCITONIN   TYPE & SCREEN   ABORH RETYPE        All Lab Results:  Results for orders placed or performed during the hospital encounter of 06/20/25   EKG 12-lead    Collection Time: 06/20/25 10:33 AM   Result Value Ref Range    QRS Duration 86 ms    [facility] QTC Calculation 457 ms   Influenza A & B by Molecular    Collection Time: 06/20/25 11:27 AM    Specimen: Nasal Swab   Result Value Ref Range    INFLUENZA A MOLECULAR Negative Negative    INFLUENZA B MOLECULAR  Negative Negative   Hepatitis C Antibody    Collection Time: 06/20/25 11:27 AM   Result Value Ref Range    Hep C Ab Interp Negative Negative   HCV Virus Hold Specimen    Collection Time: 06/20/25 11:27 AM   Result Value Ref Range    Extra Tube Hold for add-ons.    HIV 1/2 Ag/Ab (4th Gen)    Collection Time: 06/20/25 11:27 AM   Result Value Ref Range    HIV 1/2 Ag/Ab Negative Negative   Comprehensive metabolic panel    Collection Time: 06/20/25 11:27 AM   Result Value Ref Range    Sodium 127 (L) 136 - 145 mmol/L    Potassium 2.4 (LL) 3.5 - 5.1 mmol/L    Chloride 91 (L) 95 - 110 mmol/L    CO2 22 (L) 23 - 29 mmol/L    Glucose 119 (H) 70 - 110 mg/dL    BUN 35 (H) 8 - 23 mg/dL    Creatinine 0.8 0.5 - 1.4 mg/dL    Calcium 9.7 8.7 - 10.5 mg/dL    Protein Total 6.9 6.0 - 8.4 gm/dL    Albumin 3.6 3.5 - 5.2 g/dL    Bilirubin Total 0.6 0.1 - 1.0 mg/dL    ALP 74 40 - 150 unit/L    AST 16 11 - 45 unit/L    ALT 17 10 - 44 unit/L    Anion Gap 14 8 - 16 mmol/L    eGFR >60 >60 mL/min/1.73/m2   Troponin I    Collection Time: 06/20/25 11:27 AM   Result Value Ref Range    Troponin-I <0.006 <=0.026 ng/mL   Brain natriuretic peptide    Collection Time: 06/20/25 11:27 AM   Result Value Ref Range    BNP 19 0  - 99 pg/mL   COVID-19 Rapid Screening    Collection Time: 06/20/25 11:27 AM   Result Value Ref Range    SARS COV-2 Molecular Negative Negative   CBC with Differential    Collection Time: 06/20/25 11:27 AM   Result Value Ref Range    WBC 23.01 (H) 3.90 - 12.70 K/uL    RBC 2.76 (L) 4.00 - 5.40 M/uL    HGB 8.9 (L) 12.0 - 16.0 gm/dL    HCT 24.7 (L) 37.0 - 48.5 %    MCV 90 82 - 98 fL    MCH 32.2 (H) 27.0 - 31.0 pg    MCHC 36.0 32.0 - 36.0 g/dL    RDW 12.0 11.5 - 14.5 %    Platelet Count 381 150 - 450 K/uL    MPV 10.3 9.2 - 12.9 fL    Nucleated RBC 0 <=0 /100 WBC    Neut % 75.0 (H) 38 - 73 %    Lymph % 14.8 (L) 18 - 48 %    Mono % 7.0 4 - 15 %    Eos % 2.0 <=8 %    Basophil % 0.2 <=1.9 %    Imm Grans % 1.0 (H) 0.0 - 0.5 %    Neut # 17.24 (H) 1.8 - 7.7 K/uL    Lymph # 3.40 1 - 4.8 K/uL    Mono # 1.62 (H) 0.3 - 1 K/uL    Eos # 0.46 <=0.5 K/uL    Baso # 0.05 <=0.2 K/uL    Imm Grans # 0.24 (H) 0.00 - 0.04 K/uL   Urinalysis, Reflex to Urine Culture Urine, Clean Catch    Collection Time: 06/20/25 11:33 AM    Specimen: Urine   Result Value Ref Range    Color, UA Yellow Straw, Erin, Yellow, Light-Orange    Appearance, UA Clear Clear    pH, UA 6.0 5.0 - 8.0    Spec Grav UA 1.015 1.005 - 1.030    Protein, UA Negative Negative    Glucose, UA Negative Negative    Ketones, UA Negative Negative    Bilirubin, UA Negative Negative    Blood, UA Negative Negative    Nitrites, UA Negative Negative    Urobilinogen, UA Negative <2.0 EU/dL    Leukocyte Esterase, UA Negative Negative   GREY TOP URINE HOLD    Collection Time: 06/20/25 11:33 AM   Result Value Ref Range    Extra Tube Hold for add-ons.    Lactic acid, plasma #1    Collection Time: 06/20/25  2:04 PM   Result Value Ref Range    Lactic Acid Level 1.0 0.5 - 2.2 mmol/L   Occult blood x 1, stool    Collection Time: 06/20/25  2:20 PM    Specimen: Stool   Result Value Ref Range    OCCULT BLOOD STOOL Negative Negative       Imaging Results:  Imaging Results              CT Head Without  Contrast (Final result)  Result time 06/20/25 13:23:00      Final result by LAINE Jimenez Sr., MD (06/20/25 13:23:00)                   Impression:      Normal study.    All CT scans at this facility use dose modulation, iterative reconstruction, and/or weight base dosing when appropriate to reduce radiation dose when appropriate to reduce radiation dose to as low as reasonably achievable.      Electronically signed by: Isaac Jimenez MD  Date:    06/20/2025  Time:    13:23               Narrative:    EXAMINATION:  CT HEAD WITHOUT CONTRAST    CLINICAL HISTORY:  Dizziness, persistent/recurrent, cardiac or vascular cause suspected;    TECHNIQUE:  Standard brain CT protocol without IV contrast was performed.    COMPARISON:  None    FINDINGS:  The ventricles have a normal size, position, and appearance. There is no abnormal intracranial mass or intracranial hemorrhage. There is no skull fracture. The paranasal sinuses are normal in appearance.                                       CT Abdomen Pelvis With IV Contrast NO Oral Contrast (Final result)  Result time 06/20/25 13:45:38      Final result by Roberto Bates MD (06/20/25 13:45:38)                   Impression:      Source of patient's acute abdominal pain not identified.    All CT scans at this facility use dose modulation, iterative reconstruction, and/or weight based dosing when appropriate to reduce radiation dose to as low as reasonable achievable.      Electronically signed by: Roberto Bates MD  Date:    06/20/2025  Time:    13:45               Narrative:    EXAMINATION:  CT ABDOMEN PELVIS WITH IV CONTRAST    CLINICAL HISTORY:  Abdominal pain, acute, nonlocalized;    TECHNIQUE:  Low dose axial images, sagittal and coronal reformations were obtained from the lung bases to the pubic symphysis following the IV administration of 100 mL of Omnipaque 350.  Oral contrast was not administered.    COMPARISON:  None    FINDINGS:  Heart: Normal size. No  effusion.    Lung Bases: Clear.    Liver: Normal size and attenuation. No focal lesions.    Gallbladder: No calcified gallstones.    Bile Ducts: No dilatation.    Pancreas: No mass. No peripancreatic fat stranding.    Spleen: Normal.    Adrenals: Normal.    Kidneys/Ureters: Normal enhancement.  No mass or  hydroureteronephrosis.    Bladder: No wall thickening.    Reproductive organs: Normal.    GI Tract/Mesentery: No evidence of bowel obstruction or inflammation.  No evidence of appendicitis.  Mild constipation.    Peritoneal Space: No ascites or free air.    Retroperitoneum: No significant adenopathy.    Abdominal wall: Normal.    Vasculature: No aneurysm.    Bones: No acute fracture.  Mild facet arthropathy lower lumbar spine.  No suspicious lytic or sclerotic lesions.                                       X-Ray Chest PA And Lateral (Final result)  Result time 06/20/25 11:08:11      Final result by LAINE Jimenez Sr., MD (06/20/25 11:08:11)                   Impression:      1. The lungs are clear.  2. There is a healed fracture in the lateral aspect of the left 5th rib.  .      Electronically signed by: Isaac Jimenez MD  Date:    06/20/2025  Time:    11:08               Narrative:    EXAMINATION:  XR CHEST PA AND LATERAL    CLINICAL HISTORY:  Weakness    COMPARISON:  None    FINDINGS:  The size and contour of the heart are normal. The lungs are clear. There is no pneumothorax or pleural effusion.  There is a healed fracture in the lateral aspect of the left 5th rib.                                       The EKG was ordered, reviewed, and independently interpreted by the ED provider.  Interpretation time: 10:33 AM  Rate: 109 BPM  Rhythm: sinus tachycardia  Interpretation: No acute ST changes. No STEMI.           The Emergency Provider reviewed the vital signs and test results, which are outlined above.     ED Discussion     12:54 PM: A focused exam (Vital Signs Reviewed, Cardiopulmonary Exam, Capillary Refill  Evaluation, Peripheral Pulse Evaluation and Skin Examination) performed.    2:44 PM: Discussed case with Rossana Martins MD ([facility]). Dr. Martins agrees with current care and management of pt and accepts admission.   Admitting Service: [facility]  Admitting Physician: Dr. Martins  Admit to: Obs/Tele    3:30 PM: Re-evaluated pt. I have discussed test results, shared treatment plan, and the need for admission with patient/family/caretaker at bedside. Pt and/or family/caretaker express understanding at this time and agree with all information. All questions answered. Pt/caretaker/family member(s) have no further questions or concerns at this time. Pt is ready for admit.         Medical Decision Making  59 yo female c/o fatigue, weakness over past week with some periumbilical abdominal pain. she did have vomiting yesterday. No diarrhea or fever. WBC 23 H&H 8/24, Na 127, K+2.4. U/A negative. CT abd/pelvis is negative. Zosyn given for sepsis. PO and IV K+ given along with IVF, NS.    Amount and/or Complexity of Data Reviewed  External Data Reviewed: labs, radiology, ECG and notes.  Labs: ordered. Decision-making details documented in ED Course.  Radiology: ordered and independent interpretation performed. Decision-making details documented in ED Course.  ECG/medicine tests: ordered and independent interpretation performed. Decision-making details documented in ED Course.    Risk  OTC drugs.  Prescription drug management.  Decision regarding hospitalization.    Critical Care  Total time providing critical care: 50 minutes                  ED Medication(s):  Medications   potassium chloride 10 mEq in 100 mL IVPB (has no administration in time range)   sodium chloride 0.9% bolus 1,000 mL 1,000 mL (has no administration in time range)   piperacillin-tazobactam (ZOSYN) 4.5 g in D5W 100 mL IVPB (MB+) (4.5 g Intravenous New Bag 6/20/25 1441)   potassium chloride SA CR tablet 40 mEq (40 mEq Oral Given 6/20/25 1441)    0.9% NaCl infusion (1,000 mLs Intravenous New Bag 6/20/25 1134)   ondansetron injection 4 mg (4 mg Intravenous Given 6/20/25 1134)   iohexoL (OMNIPAQUE 350) injection 100 mL (100 mLs Intravenous Given 6/20/25 1314)       New Prescriptions    No medications on file               Scribe Attestation:   Scribe #1: I performed the above scribed service and the documentation accurately describes the services I performed. I attest to the accuracy of the note.     Attending:   Physician Attestation Statement for Scribe #1: I, Negin Owen MD, personally performed the services described in this documentation, as scribed by Sowmya Farmer, in my presence, and it is both accurate and complete.           Clinical Impression       ICD-10-CM ICD-9-CM   1. Sepsis, due to unspecified organism, unspecified whether acute organ dysfunction present  A41.9 038.9     995.91   2. Weakness  R53.1 780.79   3. Tachycardia  R00.0 785.0   4. Hypokalemia  E87.6 276.8   5. Abdominal pain, unspecified abdominal location  R10.9 789.00   6. Anemia, unspecified type  D64.9 285.9   7. Hyponatremia  E87.1 276.1       Disposition:   Disposition: Placed in Observation  Condition: Fair

## 2025-06-20 NOTE — ASSESSMENT & PLAN NOTE
Anemia is likely due to unclear. Most recent hemoglobin and hematocrit are listed below.  Recent Labs     06/20/25  1127   HGB 8.9*   HCT 24.7*     Plan  - Monitor serial CBC: Daily  - Transfuse PRBC if patient becomes hemodynamically unstable, symptomatic or H/H drops below 7/21.  - Patient has not received any PRBC transfusions to date  - Patient's anemia is currently stable  - FOBT negative   Check iron studies   Monitor

## 2025-06-20 NOTE — ASSESSMENT & PLAN NOTE
CT a/p showed nothing acute, mild constipation   IV antiemetics prn  IVFs  Pt reports cyclical symptoms- Likely 2/2 gastroparesis/narcotic bowel   Check UDS for THC  Will need GI f/u   Start Pepcid  Clear liquids for now   Treat constipation with bowel regimen

## 2025-06-20 NOTE — FIRST PROVIDER EVALUATION
"Medical screening examination initiated.  I have conducted a focused provider triage encounter, findings are as follows:    Brief history of present illness:  Complaining of dizziness, weakness, and decreased appetite    Vitals:    06/20/25 1028   BP: 108/74   BP Location: Right arm   Pulse: (!) 117   Resp: 14   Temp: 98.2 °F (36.8 °C)   TempSrc: Oral   SpO2: 100%   Height: 5' 4" (1.626 m)       Pertinent physical exam:  No acute distress    Brief workup plan:  Workup, further evaluation    Preliminary workup initiated; this workup will be continued and followed by the physician or advanced practice provider that is assigned to the patient when roomed.  "

## 2025-06-20 NOTE — SUBJECTIVE & OBJECTIVE
Past Medical History:   Diagnosis Date    Cancer     Diabetes mellitus     HLD (hyperlipidemia)     HPV (human papilloma virus) anogenital infection     Hypertension     Movement disorder        Past Surgical History:   Procedure Laterality Date    adnoidectomy      CARPAL TUNNEL RELEASE      FOOT SURGERY      TONSILLECTOMY         Review of patient's allergies indicates:   Allergen Reactions    Sulfamethoxazole-trimethoprim Nausea Only    Azithromycin     Sulfa (sulfonamide antibiotics)        No current facility-administered medications on file prior to encounter.     Current Outpatient Medications on File Prior to Encounter   Medication Sig    atorvastatin (LIPITOR) 40 MG tablet Take 40 mg by mouth every evening.    cetirizine (ZYRTEC) 10 MG tablet Take 10 mg by mouth once daily.    ergocalciferol (ERGOCALCIFEROL) 50,000 unit Cap Take 50,000 Units by mouth every 7 days.    EScitalopram oxalate (LEXAPRO) 10 MG tablet Take 10 mg by mouth once daily.    gabapentin (NEURONTIN) 300 MG capsule Take 300 mg by mouth 2 (two) times daily.    hydroCHLOROthiazide (HYDRODIURIL) 50 MG tablet Take 50 mg by mouth once daily.    HYDROcodone-acetaminophen (NORCO)  mg per tablet Take 1 tablet by mouth 2 (two) times a day.    linaCLOtide (LINZESS) 145 mcg Cap capsule Take 145 mcg by mouth before breakfast.    promethazine (PHENERGAN) 25 MG tablet Take 25 mg by mouth every 6 (six) hours as needed for Nausea.    verapamiL (CALAN-SR) 120 MG CR tablet Take 120 mg by mouth 2 (two) times daily.    [DISCONTINUED] calcium-vitamin D 250 mg-2.5 mcg (100 unit) per tablet Take 1 tablet by mouth 2 (two) times daily.    [DISCONTINUED] cyclobenzaprine (FLEXERIL) 10 MG tablet Take 10 mg by mouth 3 (three) times daily as needed for Muscle spasms.    [DISCONTINUED] hydroCHLOROthiazide (HYDRODIURIL) 25 MG tablet Take 25 mg by mouth once daily.    [DISCONTINUED] HYDROcodone-acetaminophen (NORCO) 7.5-325 mg per tablet Take 1 tablet by mouth every  6 (six) hours as needed for Pain.    [DISCONTINUED] lubiprostone (AMITIZA) 24 MCG Cap Take 24 mcg by mouth 2 (two) times daily.    [DISCONTINUED] melatonin 10 mg Cap Take by mouth.    [DISCONTINUED] metFORMIN (GLUCOPHAGE) 1000 MG tablet Take 1,000 mg by mouth 2 (two) times daily with meals.    [DISCONTINUED] naproxen (EC NAPROSYN) 500 MG EC tablet Take 500 mg by mouth 2 (two) times daily.    [DISCONTINUED] pravastatin (PRAVACHOL) 10 MG tablet Take 10 mg by mouth once daily.    [DISCONTINUED] verapamiL (VERELAN) 120 MG C24P Take 120 mg by mouth once daily.     Family History       Problem Relation (Age of Onset)    Cancer Mother          Tobacco Use    Smoking status: Former     Types: Cigarettes    Smokeless tobacco: Never   Substance and Sexual Activity    Alcohol use: Yes    Drug use: Never    Sexual activity: Not Currently     Partners: Male     Review of Systems   Constitutional:  Positive for activity change, appetite change and fatigue.   Gastrointestinal:  Positive for abdominal pain, nausea and vomiting.   Musculoskeletal:  Positive for arthralgias (chronic back and leg pain) and gait problem.   Neurological:  Positive for weakness (generalized) and light-headedness.   All other systems reviewed and are negative.    Objective:     Vital Signs (Most Recent):  Temp: 98.2 °F (36.8 °C) (06/20/25 1536)  Pulse: 86 (06/20/25 1536)  Resp: 20 (06/20/25 1536)  BP: (!) 155/72 (06/20/25 1536)  SpO2: 100 % (06/20/25 1636) Vital Signs (24h Range):  Temp:  [98.2 °F (36.8 °C)] 98.2 °F (36.8 °C)  Pulse:  [] 86  Resp:  [14-20] 20  SpO2:  [100 %] 100 %  BP: (108-155)/(72-74) 155/72        Body mass index is 25.73 kg/m².     Physical Exam  Vitals and nursing note reviewed.   Constitutional:       General: She is not in acute distress.     Appearance: She is well-developed. She is not diaphoretic.   HENT:      Head: Normocephalic and atraumatic.      Nose: Nose normal.   Eyes:      General: No scleral icterus.      Conjunctiva/sclera: Conjunctivae normal.   Neck:      Trachea: No tracheal deviation.   Cardiovascular:      Rate and Rhythm: Normal rate and regular rhythm.      Heart sounds: Normal heart sounds. No murmur heard.     No friction rub. No gallop.   Pulmonary:      Effort: Pulmonary effort is normal. No respiratory distress.      Breath sounds: Normal breath sounds. No stridor. No wheezing or rales.   Chest:      Chest wall: No tenderness.   Abdominal:      General: Bowel sounds are normal. There is no distension.      Palpations: Abdomen is soft. There is no mass.      Tenderness: There is no abdominal tenderness. There is no guarding or rebound.   Musculoskeletal:         General: No tenderness or deformity. Normal range of motion.      Cervical back: Normal range of motion and neck supple.   Skin:     General: Skin is warm and dry.      Coloration: Skin is not pale.      Findings: No erythema or rash.   Neurological:      General: No focal deficit present.      Mental Status: She is alert and oriented to person, place, and time.   Psychiatric:         Behavior: Behavior normal.         Thought Content: Thought content normal.                Significant Labs: All pertinent labs within the past 24 hours have been reviewed.    Significant Imaging: I have reviewed all pertinent imaging results/findings within the past 24 hours.

## 2025-06-20 NOTE — H&P
"[facility] Medicine  History & Physical    Patient Name: Rama Bravo  MRN: [id number]  Patient Class: OP- Observation  Admission Date: 6/20/2025  Attending Physician: Rossana Martins MD   Primary Care Provider: [facility] Primary Doctor         Patient information was obtained from patient and ER records.     Subjective:     Principal Problem:Intractable nausea and vomiting    Chief Complaint:   Chief Complaint   Patient presents with    Fatigue     Patient presents to ED with c/o dizziness, weakness and poor appetite x 1 week.        HPI: The patient is a 61 yo female with Chronic pain syndrome- followed by pain management, HTN, HLD, PAD and carotid artery stenosis whop presented to ED with generalized weakness and fatigue. The patient reports since 6/12/25, she had persistent N/V and generalized abdominal pain. She reports she has N/V all day unable to eat and drink anything. Admonial pain is described as genealized cramping rated 8/10 on scale. She denies constipation or diarrhea. She states her stool has been "dark". Over the past few days, she had gradually worsening fatigue, generalized weakness, and light headedness. Today, she could barely walk without her legs giving out. Vomiting has improved over past 24 hours. Pt reports she had abdomianl discomfort associated with N/V on/off for over a year.     In the ED, mild tachycardia- resolved. BP stable. Labs revealed WBC 23, Hgb 8.9 (normocytic, unknown baseline), Na 127, K 2.4, BNP, Troponin normal. UA- normal. FOBT negative. LA normal, procalcitonin normal.   CT head- nothing acute. CXR- clear. CT bad/pelvis showed mild constipation, nothing acute.   The patient was given 1 liter IVFs, IV Zofran, IV Zosyn, IV KCL 10meq, and KCL 40meq.     Past Medical History:   Diagnosis Date    Cancer     Diabetes mellitus     HLD (hyperlipidemia)     HPV (human papilloma virus) anogenital infection     Hypertension     Movement disorder        Past " Surgical History:   Procedure Laterality Date    adnoidectomy      CARPAL TUNNEL RELEASE      FOOT SURGERY      TONSILLECTOMY         Review of patient's allergies indicates:   Allergen Reactions    Sulfamethoxazole-trimethoprim Nausea Only    Azithromycin     Sulfa (sulfonamide antibiotics)        No current facility-administered medications on file prior to encounter.     Current Outpatient Medications on File Prior to Encounter   Medication Sig    atorvastatin (LIPITOR) 40 MG tablet Take 40 mg by mouth every evening.    cetirizine (ZYRTEC) 10 MG tablet Take 10 mg by mouth once daily.    ergocalciferol (ERGOCALCIFEROL) 50,000 unit Cap Take 50,000 Units by mouth every 7 days.    EScitalopram oxalate (LEXAPRO) 10 MG tablet Take 10 mg by mouth once daily.    gabapentin (NEURONTIN) 300 MG capsule Take 300 mg by mouth 2 (two) times daily.    hydroCHLOROthiazide (HYDRODIURIL) 50 MG tablet Take 50 mg by mouth once daily.    HYDROcodone-acetaminophen (NORCO)  mg per tablet Take 1 tablet by mouth 2 (two) times a day.    linaCLOtide (LINZESS) 145 mcg Cap capsule Take 145 mcg by mouth before breakfast.    promethazine (PHENERGAN) 25 MG tablet Take 25 mg by mouth every 6 (six) hours as needed for Nausea.    verapamiL (CALAN-SR) 120 MG CR tablet Take 120 mg by mouth 2 (two) times daily.    [DISCONTINUED] calcium-vitamin D 250 mg-2.5 mcg (100 unit) per tablet Take 1 tablet by mouth 2 (two) times daily.    [DISCONTINUED] cyclobenzaprine (FLEXERIL) 10 MG tablet Take 10 mg by mouth 3 (three) times daily as needed for Muscle spasms.    [DISCONTINUED] hydroCHLOROthiazide (HYDRODIURIL) 25 MG tablet Take 25 mg by mouth once daily.    [DISCONTINUED] HYDROcodone-acetaminophen (NORCO) 7.5-325 mg per tablet Take 1 tablet by mouth every 6 (six) hours as needed for Pain.    [DISCONTINUED] lubiprostone (AMITIZA) 24 MCG Cap Take 24 mcg by mouth 2 (two) times daily.    [DISCONTINUED] melatonin 10 mg Cap Take by mouth.    [DISCONTINUED]  metFORMIN (GLUCOPHAGE) 1000 MG tablet Take 1,000 mg by mouth 2 (two) times daily with meals.    [DISCONTINUED] naproxen (EC NAPROSYN) 500 MG EC tablet Take 500 mg by mouth 2 (two) times daily.    [DISCONTINUED] pravastatin (PRAVACHOL) 10 MG tablet Take 10 mg by mouth once daily.    [DISCONTINUED] verapamiL (VERELAN) 120 MG C24P Take 120 mg by mouth once daily.     Family History       Problem Relation (Age of Onset)    Cancer Mother          Tobacco Use    Smoking status: Former     Types: Cigarettes    Smokeless tobacco: Never   Substance and Sexual Activity    Alcohol use: Yes    Drug use: Never    Sexual activity: Not Currently     Partners: Male     Review of Systems   Constitutional:  Positive for activity change, appetite change and fatigue.   Gastrointestinal:  Positive for abdominal pain, nausea and vomiting.   Musculoskeletal:  Positive for arthralgias (chronic back and leg pain) and gait problem.   Neurological:  Positive for weakness (generalized) and light-headedness.   All other systems reviewed and are negative.    Objective:     Vital Signs (Most Recent):  Temp: 98.2 °F (36.8 °C) (06/20/25 1536)  Pulse: 86 (06/20/25 1536)  Resp: 20 (06/20/25 1536)  BP: (!) 155/72 (06/20/25 1536)  SpO2: 100 % (06/20/25 1636) Vital Signs (24h Range):  Temp:  [98.2 °F (36.8 °C)] 98.2 °F (36.8 °C)  Pulse:  [] 86  Resp:  [14-20] 20  SpO2:  [100 %] 100 %  BP: (108-155)/(72-74) 155/72        Body mass index is 25.73 kg/m².     Physical Exam  Vitals and nursing note reviewed.   Constitutional:       General: She is not in acute distress.     Appearance: She is well-developed. She is not diaphoretic.   HENT:      Head: Normocephalic and atraumatic.      Nose: Nose normal.   Eyes:      General: No scleral icterus.     Conjunctiva/sclera: Conjunctivae normal.   Neck:      Trachea: No tracheal deviation.   Cardiovascular:      Rate and Rhythm: Normal rate and regular rhythm.      Heart sounds: Normal heart sounds. No murmur  heard.     No friction rub. No gallop.   Pulmonary:      Effort: Pulmonary effort is normal. No respiratory distress.      Breath sounds: Normal breath sounds. No stridor. No wheezing or rales.   Chest:      Chest wall: No tenderness.   Abdominal:      General: Bowel sounds are normal. There is no distension.      Palpations: Abdomen is soft. There is no mass.      Tenderness: There is no abdominal tenderness. There is no guarding or rebound.   Musculoskeletal:         General: No tenderness or deformity. Normal range of motion.      Cervical back: Normal range of motion and neck supple.   Skin:     General: Skin is warm and dry.      Coloration: Skin is not pale.      Findings: No erythema or rash.   Neurological:      General: No focal deficit present.      Mental Status: She is alert and oriented to person, place, and time.   Psychiatric:         Behavior: Behavior normal.         Thought Content: Thought content normal.                Significant Labs: All pertinent labs within the past 24 hours have been reviewed.    Significant Imaging: I have reviewed all pertinent imaging results/findings within the past 24 hours.  Assessment/Plan:     Assessment & Plan  Intractable nausea and vomiting and abdominal pain  CT a/p showed nothing acute, mild constipation   IV antiemetics prn  IVFs  Pt reports cyclical symptoms- Likely 2/2 gastroparesis/narcotic bowel   Check UDS for THC  Will need GI f/u   Start Pepcid  Clear liquids for now   Treat constipation with bowel regimen     Hypokalemia  Patient's most recent potassium results are listed below.   Recent Labs     06/20/25  1127   K 2.4*     Plan  - Replete potassium per protocol  - Monitor potassium Daily  - Patient's hypokalemia is worsening. Will continue current treatment    Hyponatremia  Hyponatremia is likely due to Dehydration/hypovolemia and tea and toast syndrome. The patient's most recent sodium results are listed below.  Recent Labs     06/20/25  1127   NA  127*     Plan  - Correct the sodium by 4-6mEq in 24 hours.   - Obtain the following studies: TSH, T4.  - Will treat the hyponatremia with IV fluids as follows: NS  - Monitor sodium Daily.   - Patient hyponatremia is worsening. Will continue current treatment    Leukocytosis  Etiology unclear  CXR/UA unremarkable   CT a/p- unremarkable   Received IV Zosyn in ED x one dose   Blood cultures   Monitor     General weakness  PT/OT    Constipation  Cont Linzess   Add Miralax and pericolace  Add Dulcolax x one dose     Normocytic anemia  Anemia is likely due to unclear. Most recent hemoglobin and hematocrit are listed below.  Recent Labs     06/20/25  1127   HGB 8.9*   HCT 24.7*     Plan  - Monitor serial CBC: Daily  - Transfuse PRBC if patient becomes hemodynamically unstable, symptomatic or H/H drops below 7/21.  - Patient has not received any PRBC transfusions to date  - Patient's anemia is currently stable  - FOBT negative   Check iron studies   Monitor   HTN (hypertension)  Patient's blood pressure range in the last 24 hours was: BP  Min: 108/74  Max: 155/72.The patient's inpatient anti-hypertensive regimen is listed below:  Current Antihypertensives  , 2 times daily, Oral  , Daily, Oral  verapamiL CR tablet 120 mg, 2 times daily, Oral    Plan  - BP is controlled, no changes needed to their regimen    HLD (hyperlipidemia)  Stable   Cont statin     Chronic pain  Followed by pain management for chronic pain to back and legs   Cont Norco and gabapentin     VTE Risk Mitigation (From admission, onward)           Ordered     enoxaparin injection 40 mg  Daily         06/20/25 1701     Place sequential compression device  Until discontinued         06/20/25 1701     IP VTE LOW RISK PATIENT  Once         06/20/25 1701     Place sequential compression device  Until discontinued         06/20/25 1701                                     On 06/20/2025, patient should be placed in hospital observation services under my care in  collaboration with Dr. Martins .           Gardenia Parker NP  Department of Hospital Medicine  '[facility] Tele[facility] ([facility]

## 2025-06-20 NOTE — ASSESSMENT & PLAN NOTE
Etiology unclear  CXR/UA unremarkable   CT a/p- unremarkable   Received IV Zosyn in ED x one dose   Blood cultures   Monitor

## 2025-06-20 NOTE — HPI
"The patient is a 61 yo female with Chronic pain syndrome- followed by pain management, HTN, HLD, PAD and carotid artery stenosis who presented to ED with generalized weakness and fatigue. The patient reports since 6/12/25, she had persistent N/V and generalized abdominal pain. She reports she has N/V all day unable to eat and drink anything. Admonial pain is described as generalized cramping rated 8/10 on scale. She denies constipation or diarrhea. She states her stool has been "dark". Over the past few days, she had gradually worsening fatigue, generalized weakness, and light headedness. Today, she could barely walk without her legs giving out. Vomiting has improved over past 24 hours. Pt reports she had abdomianl discomfort associated with N/V on/off for over a year.     In the ED, mild tachycardia- resolved. BP stable. Labs revealed WBC 23, Hgb 8.9 (normocytic, unknown baseline), Na 127, K 2.4, BNP, Troponin normal. UA- normal. FOBT negative. LA normal, procalcitonin normal.   CT head- nothing acute. CXR- clear. CT bad/pelvis showed mild constipation, nothing acute.   The patient was given 1 liter IVFs, IV Zofran, IV Zosyn, IV KCL 10meq, and KCL 40meq.   "

## 2025-06-20 NOTE — ASSESSMENT & PLAN NOTE
Hyponatremia is likely due to Dehydration/hypovolemia and tea and toast syndrome. The patient's most recent sodium results are listed below.  Recent Labs     06/20/25  1127   *     Plan  - Correct the sodium by 4-6mEq in 24 hours.   - Obtain the following studies: TSH, T4.  - Will treat the hyponatremia with IV fluids as follows: NS  - Monitor sodium Daily.   - Patient hyponatremia is worsening. Will continue current treatment

## 2025-06-20 NOTE — ASSESSMENT & PLAN NOTE
Patient's most recent potassium results are listed below.   Recent Labs     06/20/25  1127   K 2.4*     Plan  - Replete potassium per protocol  - Monitor potassium Daily  - Patient's hypokalemia is worsening. Will continue current treatment

## 2025-06-20 NOTE — PHARMACY MED REC
"Admission Medication History     The home medication history was taken by Carlos Richards.    You may go to "Admission" then "Reconcile Home Medications" tabs to review and/or act upon these items.     The home medication list has been updated by the Pharmacy department.   Please read ALL comments highlighted in yellow.   Please address this information as you see fit.    Feel free to contact us if you have any questions or require assistance.      The medications listed below were removed from the home medication list. Please reorder if appropriate:  Patient reports no longer taking the following medication(s):  AMITIZA 24MCG  MELATONIN 10MG  METFORMIN 1000MG  NAPROXEN 500MG  PRAVACHOL 10MG      Medications listed below were obtained from: Patient/family and Analytic software- [employer]  (Not in a hospital admission)        Carlos Richards  [phone]    Current Outpatient Medications on File Prior to Encounter   Medication Sig Dispense Refill Last Dose/Taking    atorvastatin (LIPITOR) 40 MG tablet Take 40 mg by mouth once daily.   6/19/2025    cetirizine (ZYRTEC) 10 MG tablet Take 10 mg by mouth once daily.   6/19/2025    ergocalciferol (ERGOCALCIFEROL) 50,000 unit Cap Take 50,000 Units by mouth every 7 days.   Past Week    EScitalopram oxalate (LEXAPRO) 10 MG tablet Take 10 mg by mouth once daily.   6/19/2025    gabapentin (NEURONTIN) 300 MG capsule Take 300 mg by mouth every 8 (eight) hours as needed.   6/19/2025    hydroCHLOROthiazide (HYDRODIURIL) 50 MG tablet Take 50 mg by mouth once daily.   6/19/2025    HYDROcodone-acetaminophen (NORCO)  mg per tablet Take 1 tablet by mouth every 12 (twelve) hours as needed.   6/19/2025    linaCLOtide (LINZESS) 145 mcg Cap capsule Take 145 mcg by mouth before breakfast.   6/19/2025    verapamiL (CALAN-SR) 120 MG CR tablet Take 120 mg by mouth 2 (two) times daily.   6/19/2025     .          "

## 2025-06-21 LAB
ABO + RH BLD: NORMAL
ABSOLUTE EOSINOPHIL (OHS): 0.06 K/UL
ABSOLUTE MONOCYTE (OHS): 1.26 K/UL (ref 0.3–1)
ABSOLUTE NEUTROPHIL COUNT (OHS): 11.18 K/UL (ref 1.8–7.7)
ALBUMIN SERPL BCP-MCNC: 2.9 G/DL (ref 3.5–5.2)
ALP SERPL-CCNC: 60 UNIT/L (ref 40–150)
ALT SERPL W/O P-5'-P-CCNC: 15 UNIT/L (ref 10–44)
ANION GAP (OHS): 7 MMOL/L (ref 8–16)
AST SERPL-CCNC: 14 UNIT/L (ref 11–45)
BASOPHILS # BLD AUTO: 0.05 K/UL
BASOPHILS NFR BLD AUTO: 0.3 %
BILIRUB SERPL-MCNC: 0.3 MG/DL (ref 0.1–1)
BLD PROD TYP BPU: NORMAL
BLOOD UNIT EXPIRATION DATE: NORMAL
BLOOD UNIT TYPE CODE: 9500
BUN SERPL-MCNC: 20 MG/DL (ref 8–23)
CALCIUM SERPL-MCNC: 7.9 MG/DL (ref 8.7–10.5)
CHLORIDE SERPL-SCNC: 105 MMOL/L (ref 95–110)
CO2 SERPL-SCNC: 20 MMOL/L (ref 23–29)
CREAT SERPL-MCNC: 0.7 MG/DL (ref 0.5–1.4)
CROSSMATCH INTERPRETATION: NORMAL
DISPENSE STATUS: NORMAL
ERYTHROCYTE [DISTWIDTH] IN BLOOD BY AUTOMATED COUNT: 12.6 % (ref 11.5–14.5)
FOLATE SERPL-MCNC: 8.5 NG/ML (ref 4–24)
GFR SERPLBLD CREATININE-BSD FMLA CKD-EPI: >60 ML/MIN/1.73/M2
GLUCOSE SERPL-MCNC: 88 MG/DL (ref 70–110)
HCT VFR BLD AUTO: 18.7 % (ref 37–48.5)
HGB BLD-MCNC: 6.3 GM/DL (ref 12–16)
IMM GRANULOCYTES # BLD AUTO: 0.16 K/UL (ref 0–0.04)
IMM GRANULOCYTES NFR BLD AUTO: 1 % (ref 0–0.5)
IRON SATN MFR SERPL: 26 % (ref 20–50)
IRON SERPL-MCNC: 103 UG/DL (ref 30–160)
LYMPHOCYTES # BLD AUTO: 2.81 K/UL (ref 1–4.8)
MAGNESIUM SERPL-MCNC: 1.5 MG/DL (ref 1.6–2.6)
MCH RBC QN AUTO: 31.8 PG (ref 27–31)
MCHC RBC AUTO-ENTMCNC: 33.7 G/DL (ref 32–36)
MCV RBC AUTO: 94 FL (ref 82–98)
NUCLEATED RBC (/100WBC) (OHS): 0 /100 WBC
OHS QRS DURATION: 86 MS
OHS QTC CALCULATION: 457 MS
PHOSPHATE SERPL-MCNC: 1.5 MG/DL (ref 2.7–4.5)
PLATELET # BLD AUTO: 294 K/UL (ref 150–450)
PMV BLD AUTO: 10.2 FL (ref 9.2–12.9)
POTASSIUM SERPL-SCNC: 3.8 MMOL/L (ref 3.5–5.1)
PROT SERPL-MCNC: 5.4 GM/DL (ref 6–8.4)
RBC # BLD AUTO: 1.98 M/UL (ref 4–5.4)
RELATIVE EOSINOPHIL (OHS): 0.4 %
RELATIVE LYMPHOCYTE (OHS): 18.1 % (ref 18–48)
RELATIVE MONOCYTE (OHS): 8.1 % (ref 4–15)
RELATIVE NEUTROPHIL (OHS): 72.1 % (ref 38–73)
SODIUM SERPL-SCNC: 132 MMOL/L (ref 136–145)
TIBC SERPL-MCNC: 400 UG/DL (ref 250–450)
TRANSFERRIN SERPL-MCNC: 270 MG/DL (ref 200–375)
UNIT NUMBER: NORMAL
VIT B12 SERPL-MCNC: 392 PG/ML (ref 210–950)
WBC # BLD AUTO: 15.52 K/UL (ref 3.9–12.7)

## 2025-06-21 PROCEDURE — 85025 COMPLETE CBC W/AUTO DIFF WBC: CPT | Performed by: NURSE PRACTITIONER

## 2025-06-21 PROCEDURE — 63600175 PHARM REV CODE 636 W HCPCS: Performed by: NURSE PRACTITIONER

## 2025-06-21 PROCEDURE — 36415 COLL VENOUS BLD VENIPUNCTURE: CPT | Performed by: NURSE PRACTITIONER

## 2025-06-21 PROCEDURE — 25000003 PHARM REV CODE 250: Performed by: NURSE PRACTITIONER

## 2025-06-21 PROCEDURE — P9016 RBC LEUKOCYTES REDUCED: HCPCS | Performed by: NURSE PRACTITIONER

## 2025-06-21 PROCEDURE — 86920 COMPATIBILITY TEST SPIN: CPT | Performed by: NURSE PRACTITIONER

## 2025-06-21 PROCEDURE — 21400001 HC TELEMETRY ROOM

## 2025-06-21 PROCEDURE — 99223 1ST HOSP IP/OBS HIGH 75: CPT | Mod: ,,, | Performed by: INTERNAL MEDICINE

## 2025-06-21 PROCEDURE — 83735 ASSAY OF MAGNESIUM: CPT | Performed by: NURSE PRACTITIONER

## 2025-06-21 PROCEDURE — 36430 TRANSFUSION BLD/BLD COMPNT: CPT

## 2025-06-21 PROCEDURE — 84100 ASSAY OF PHOSPHORUS: CPT | Performed by: NURSE PRACTITIONER

## 2025-06-21 PROCEDURE — 82040 ASSAY OF SERUM ALBUMIN: CPT | Performed by: NURSE PRACTITIONER

## 2025-06-21 PROCEDURE — 30233N1 TRANSFUSION OF NONAUTOLOGOUS RED BLOOD CELLS INTO PERIPHERAL VEIN, PERCUTANEOUS APPROACH: ICD-10-PCS | Performed by: INTERNAL MEDICINE

## 2025-06-21 RX ORDER — PSEUDOEPHEDRINE/ACETAMINOPHEN 30MG-500MG
100 TABLET ORAL
Status: DISCONTINUED | OUTPATIENT
Start: 2025-06-21 | End: 2025-06-21

## 2025-06-21 RX ORDER — SYRING-NEEDL,DISP,INSUL,0.3 ML 29 G X1/2"
296 SYRINGE, EMPTY DISPOSABLE MISCELLANEOUS
Status: DISCONTINUED | OUTPATIENT
Start: 2025-06-21 | End: 2025-06-21

## 2025-06-21 RX ORDER — MAGNESIUM SULFATE HEPTAHYDRATE 40 MG/ML
2 INJECTION, SOLUTION INTRAVENOUS ONCE
Status: COMPLETED | OUTPATIENT
Start: 2025-06-21 | End: 2025-06-21

## 2025-06-21 RX ORDER — SODIUM CHLORIDE 0.9 G/100ML
500 IRRIGANT IRRIGATION
Status: DISCONTINUED | OUTPATIENT
Start: 2025-06-21 | End: 2025-06-21

## 2025-06-21 RX ADMIN — POLYETHYLENE GLYCOL 3350 17 G: 17 POWDER, FOR SOLUTION ORAL at 08:06

## 2025-06-21 RX ADMIN — LINACLOTIDE 145 MCG: 145 CAPSULE, GELATIN COATED ORAL at 06:06

## 2025-06-21 RX ADMIN — CETIRIZINE HYDROCHLORIDE 10 MG: 10 TABLET, FILM COATED ORAL at 09:06

## 2025-06-21 RX ADMIN — SENNOSIDES AND DOCUSATE SODIUM 1 TABLET: 50; 8.6 TABLET ORAL at 08:06

## 2025-06-21 RX ADMIN — HYDROCODONE BITARTRATE AND ACETAMINOPHEN 1 TABLET: 10; 325 TABLET ORAL at 09:06

## 2025-06-21 RX ADMIN — GABAPENTIN 300 MG: 300 CAPSULE ORAL at 08:06

## 2025-06-21 RX ADMIN — ATORVASTATIN CALCIUM 40 MG: 40 TABLET, FILM COATED ORAL at 09:06

## 2025-06-21 RX ADMIN — GABAPENTIN 300 MG: 300 CAPSULE ORAL at 09:06

## 2025-06-21 RX ADMIN — MAGNESIUM SULFATE HEPTAHYDRATE 2 G: 40 INJECTION, SOLUTION INTRAVENOUS at 06:06

## 2025-06-21 RX ADMIN — VERAPAMIL HYDROCHLORIDE 120 MG: 120 TABLET, FILM COATED, EXTENDED RELEASE ORAL at 09:06

## 2025-06-21 RX ADMIN — SODIUM CHLORIDE: 9 INJECTION, SOLUTION INTRAVENOUS at 06:06

## 2025-06-21 RX ADMIN — SODIUM CHLORIDE: 9 INJECTION, SOLUTION INTRAVENOUS at 09:06

## 2025-06-21 RX ADMIN — FAMOTIDINE 20 MG: 20 TABLET ORAL at 09:06

## 2025-06-21 RX ADMIN — VERAPAMIL HYDROCHLORIDE 120 MG: 120 TABLET, FILM COATED, EXTENDED RELEASE ORAL at 08:06

## 2025-06-21 RX ADMIN — FAMOTIDINE 20 MG: 20 TABLET ORAL at 08:06

## 2025-06-21 RX ADMIN — ESCITALOPRAM OXALATE 10 MG: 10 TABLET ORAL at 08:06

## 2025-06-21 NOTE — ASSESSMENT & PLAN NOTE
"Anemia is likely due to unclear. Most recent hemoglobin and hematocrit are listed below.  Recent Labs     06/20/25  1127 06/21/25  0459   HGB 8.9* 6.3*   HCT 24.7* 18.7*     Plan  - Monitor serial CBC: Daily  - Transfuse PRBC if patient becomes hemodynamically unstable, symptomatic or H/H drops below 7/21.  - Patient has not received any PRBC transfusions to date  - Patient's anemia is currently stable  - FOBT negative     6/21/25: Iron studies normal. ?"Black liquid" with stool today. GI consulted. One unit PRBC. GI will plan inpt versus op EGD. Monitor     "

## 2025-06-21 NOTE — ASSESSMENT & PLAN NOTE
Hyponatremia is likely due to Dehydration/hypovolemia and tea and toast syndrome. The patient's most recent sodium results are listed below.  Recent Labs     06/20/25  1127 06/21/25  0459   * 132*     Plan  - Correct the sodium by 4-6mEq in 24 hours.   - Obtain the following studies: TSH, T4.  - Will treat the hyponatremia with IV fluids as follows: NS  - Monitor sodium Daily.   - Patient hyponatremia is worsening. Will continue current treatment    6/21/25: NA improving- cont IVfs

## 2025-06-21 NOTE — ASSESSMENT & PLAN NOTE
"CT a/p showed nothing acute, mild constipation   IV antiemetics prn  IVFs  Pt reports cyclical symptoms- Likely 2/2 gastroparesis/narcotic bowel   Check UDS for THC  Will need GI f/u   Start Pepcid  Clear liquids for now   Treat constipation with bowel regimen     6/21/25: N/V and abdominal pain improved with treatment of constipation. "Black liquid" noted with stools. Drop in H/H noted. EGD inpt versus OP    "

## 2025-06-21 NOTE — HOSPITAL COURSE
The patient is a 61 yo female with Chronic pain syndrome- followed by pain management, HTN, HLD, PAD and carotid artery stenosis admitted with intractable abdominal pain and N/V, Hypokalemia, hyponatremia, and leukocytosis. CT abd/pelvis showed nothing acute, constipation. CXR/UA unremarkable. Blood cultures show NGTD. Pt placed on NS IVFs. K was repleted. Serum Na and K improved. Leukocytosis trended down. Pt placed on bowel regimen. Abdominal pain and N/V improved with treatment of constipation.   Pt noted to be anemic. Her Hgb dropped 8.9>6.3. Pt reported some black liquid mixed with stool. GI consulted. 1 unit PRBC ordered. Iron studies were normal. Pt has hx gastric ulcer approx 10 years ago. FOBT negative. Hgb only improved to 6.5. IV Protonix added. 2 units PRBC ordered. GI performed EGD. Two clean based small ulcerations in gastric antrum. Biopsies taken on 6/23. Potassium noted to be 2.8. IV and po replacement ordered. Potassium improved. She was tolerating oral intake. Repeat scope in 2 months discussed with patient. Patient reported she is moving to [place]. Informed patient no matter her location she needed to have repeat EGD. She verbalized understanding. Patient seen and examined, stable for discharge.

## 2025-06-21 NOTE — SUBJECTIVE & OBJECTIVE
"Interval History: Intractable N/V and abdominal pain improved after BM x 2. Reports "black liquid" with stool x 2. +generalized weakness and fatigue. Drop in H/H noted. GI consulted. One unit PRBC ordered. Inpt versus op scopes.     Review of Systems   Constitutional:  Positive for activity change, appetite change and fatigue.   Gastrointestinal:  Positive for blood in stool (reports "black liquid" in her stools).   Musculoskeletal:  Positive for arthralgias and back pain.   Neurological:  Positive for weakness.     Objective:     Vital Signs (Most Recent):  Temp: 97.9 °F (36.6 °C) (06/21/25 0939)  Pulse: 79 (06/21/25 0939)  Resp: 17 (06/21/25 0939)  BP: (!) 101/58 (06/21/25 0939)  SpO2: 98 % (06/21/25 0939) Vital Signs (24h Range):  Temp:  [97.6 °F (36.4 °C)-98.4 °F (36.9 °C)] 97.9 °F (36.6 °C)  Pulse:  [77-91] 79  Resp:  [17-20] 17  SpO2:  [96 %-100 %] 98 %  BP: (101-155)/(53-72) 101/58     Weight: 71.7 kg (158 lb 1.1 oz)  Body mass index is 27.13 kg/m².    Intake/Output Summary (Last 24 hours) at 6/21/2025 1112  Last data filed at 6/20/2025 1753  Gross per 24 hour   Intake 480 ml   Output --   Net 480 ml         Physical Exam  Vitals and nursing note reviewed.   Constitutional:       General: She is not in acute distress.     Appearance: She is well-developed. She is not diaphoretic.   HENT:      Head: Normocephalic and atraumatic.      Nose: Nose normal.   Eyes:      General: No scleral icterus.     Conjunctiva/sclera: Conjunctivae normal.   Neck:      Trachea: No tracheal deviation.   Cardiovascular:      Rate and Rhythm: Normal rate and regular rhythm.      Heart sounds: Normal heart sounds. No murmur heard.     No friction rub. No gallop.   Pulmonary:      Effort: Pulmonary effort is normal. No respiratory distress.      Breath sounds: Normal breath sounds. No stridor. No wheezing or rales.   Chest:      Chest wall: No tenderness.   Abdominal:      General: Bowel sounds are normal. There is no distension.     "  Palpations: Abdomen is soft. There is no mass.      Tenderness: There is no abdominal tenderness. There is no guarding or rebound.   Musculoskeletal:         General: No tenderness or deformity. Normal range of motion.      Cervical back: Normal range of motion and neck supple.   Skin:     General: Skin is warm and dry.      Coloration: Skin is pale.      Findings: No erythema or rash.   Neurological:      General: No focal deficit present.      Mental Status: She is alert and oriented to person, place, and time.   Psychiatric:         Behavior: Behavior normal.         Thought Content: Thought content normal.               Significant Labs: All pertinent labs within the past 24 hours have been reviewed.    Significant Imaging: I have reviewed all pertinent imaging results/findings within the past 24 hours.

## 2025-06-21 NOTE — PLAN OF CARE
A235/A235 BC Bravo is a 60 y.o.female admitted on 6/20/2025 for Intractable nausea and vomiting   Code Status: Full Code MRN: [id number]   Review of patient's allergies indicates:   Allergen Reactions    Sulfamethoxazole-trimethoprim Nausea Only    Azithromycin     Sulfa (sulfonamide antibiotics)      Past Medical History:   Diagnosis Date    Cancer     Diabetes mellitus     HLD (hyperlipidemia)     HPV (human papilloma virus) anogenital infection     Hypertension     Movement disorder       PRN meds    acetaminophen, 650 mg, Q4H PRN  albuterol-ipratropium, 3 mL, Q6H PRN  aluminum-magnesium hydroxide-simethicone, 30 mL, QID PRN  dextrose 50%, 12.5 g, PRN  dextrose 50%, 25 g, PRN  glucagon (human recombinant), 1 mg, PRN  glucose, 16 g, PRN  glucose, 24 g, PRN  HYDROcodone-acetaminophen, 1 tablet, Q8H PRN  melatonin, 6 mg, Nightly PRN  naloxone, 0.02 mg, PRN  ondansetron, 4 mg, Q6H PRN  prochlorperazine, 5 mg, Q6H PRN  simethicone, 1 tablet, QID PRN  sodium chloride 0.9%, 10 mL, Q8H PRN         Pt oriented x4.    Pt remained afebrile throughout this shift.   All meds administered per order.   Pt remained free of falls this shift.   Plan of care reviewed. Patient verbalizes understanding.   Pt moving/turning standby assist  Bed low, side rails up x 2, wheels locked, call light in reach.   Patient instructed to call for assistance.  Patient education provided                  Omar Coma Scale Score: 15     Lead Monitored: Lead II Rhythm: normal sinus rhythm    Cardiac/Telemetry Box Number: [id number]  VTE Core Measure: Pharmacological prophylaxis initiated/maintained Last Bowel Movement: 06/21/25  Diet Clear Liquid     Sigifredo Score: 22  Fall Risk Score: 7  Accucheck []   Freq?      Lines/Drains/Airways       Peripheral Intravenous Line  Duration             Peripheral IV 06/20/25 1133 20 G 1 in Anterior;Proximal;Right Forearm 1 day

## 2025-06-21 NOTE — PROGRESS NOTES
"[facility] Medicine  Progress Note    Patient Name: Rama Bravo  MRN: [id number]  Patient Class: IP- Inpatient   Admission Date: 6/20/2025  Length of Stay: 1 days  Attending Physician: Rossana Martins MD  Primary Care Provider: Bibiana, Primary Doctor        Subjective     Principal Problem:Intractable nausea and vomiting        HPI:  The patient is a 59 yo female with Chronic pain syndrome- followed by pain management, HTN, HLD, PAD and carotid artery stenosis who presented to ED with generalized weakness and fatigue. The patient reports since 6/12/25, she had persistent N/V and generalized abdominal pain. She reports she has N/V all day unable to eat and drink anything. Admonial pain is described as generalized cramping rated 8/10 on scale. She denies constipation or diarrhea. She states her stool has been "dark". Over the past few days, she had gradually worsening fatigue, generalized weakness, and light headedness. Today, she could barely walk without her legs giving out. Vomiting has improved over past 24 hours. Pt reports she had abdomianl discomfort associated with N/V on/off for over a year.     In the ED, mild tachycardia- resolved. BP stable. Labs revealed WBC 23, Hgb 8.9 (normocytic, unknown baseline), Na 127, K 2.4, BNP, Troponin normal. UA- normal. FOBT negative. LA normal, procalcitonin normal.   CT head- nothing acute. CXR- clear. CT bad/pelvis showed mild constipation, nothing acute.   The patient was given 1 liter IVFs, IV Zofran, IV Zosyn, IV KCL 10meq, and KCL 40meq.     Overview/Hospital Course:  The patient is a 59 yo female with Chronic pain syndrome- followed by pain management, HTN, HLD, PAD and carotid artery stenosis admitted with intractable abdominal pain and N/V, Hypokalemia, hyponatremia, and leukocytosis. CT abd/pelvis showed nothing acute, constipation. CXR/UA unremarkable. Blood cultures show NGTD. Pt placed on NS IVFs. K was repleted. Serum Na and K improved. " "Leukocytosis trending down. Pt placed on bowel regimen. Abdominal pain and N/V improved with treatment of constipation.   Overnight, Hgb dropped 8.9>6.3. Pt reported she had 2 BM- some black liquid noted. GI consulted. 1 unit PRBC ordered. Iron studies were normal. Pt has hx gastric ulcer approx 10 years ago. GI does not suspect GI bleed since FOBT negative, pt was constipated, and BUN normal. She will assess for appropriate timing of scopes for her and whether they need to be pursued inpatient vs outpatient.        Interval History: Intractable N/V and abdominal pain improved after BM x 2. Reports "black liquid" with stool x 2. +generalized weakness and fatigue. Drop in H/H noted. GI consulted. One unit PRBC ordered. Inpt versus op scopes.     Review of Systems   Constitutional:  Positive for activity change, appetite change and fatigue.   Gastrointestinal:  Positive for blood in stool (reports "black liquid" in her stools).   Musculoskeletal:  Positive for arthralgias and back pain.   Neurological:  Positive for weakness.     Objective:     Vital Signs (Most Recent):  Temp: 97.9 °F (36.6 °C) (06/21/25 0939)  Pulse: 79 (06/21/25 0939)  Resp: 17 (06/21/25 0939)  BP: (!) 101/58 (06/21/25 0939)  SpO2: 98 % (06/21/25 0939) Vital Signs (24h Range):  Temp:  [97.6 °F (36.4 °C)-98.4 °F (36.9 °C)] 97.9 °F (36.6 °C)  Pulse:  [77-91] 79  Resp:  [17-20] 17  SpO2:  [96 %-100 %] 98 %  BP: (101-155)/(53-72) 101/58     Weight: 71.7 kg (158 lb 1.1 oz)  Body mass index is 27.13 kg/m².    Intake/Output Summary (Last 24 hours) at 6/21/2025 1112  Last data filed at 6/20/2025 9499  Gross per 24 hour   Intake 480 ml   Output --   Net 480 ml         Physical Exam  Vitals and nursing note reviewed.   Constitutional:       General: She is not in acute distress.     Appearance: She is well-developed. She is not diaphoretic.   HENT:      Head: Normocephalic and atraumatic.      Nose: Nose normal.   Eyes:      General: No scleral icterus.     " "Conjunctiva/sclera: Conjunctivae normal.   Neck:      Trachea: No tracheal deviation.   Cardiovascular:      Rate and Rhythm: Normal rate and regular rhythm.      Heart sounds: Normal heart sounds. No murmur heard.     No friction rub. No gallop.   Pulmonary:      Effort: Pulmonary effort is normal. No respiratory distress.      Breath sounds: Normal breath sounds. No stridor. No wheezing or rales.   Chest:      Chest wall: No tenderness.   Abdominal:      General: Bowel sounds are normal. There is no distension.      Palpations: Abdomen is soft. There is no mass.      Tenderness: There is no abdominal tenderness. There is no guarding or rebound.   Musculoskeletal:         General: No tenderness or deformity. Normal range of motion.      Cervical back: Normal range of motion and neck supple.   Skin:     General: Skin is warm and dry.      Coloration: Skin is pale.      Findings: No erythema or rash.   Neurological:      General: No focal deficit present.      Mental Status: She is alert and oriented to person, place, and time.   Psychiatric:         Behavior: Behavior normal.         Thought Content: Thought content normal.               Significant Labs: All pertinent labs within the past 24 hours have been reviewed.    Significant Imaging: I have reviewed all pertinent imaging results/findings within the past 24 hours.      Assessment & Plan  Intractable nausea and vomiting and abdominal pain  CT a/p showed nothing acute, mild constipation   IV antiemetics prn  IVFs  Pt reports cyclical symptoms- Likely 2/2 gastroparesis/narcotic bowel   Check UDS for THC  Will need GI f/u   Start Pepcid  Clear liquids for now   Treat constipation with bowel regimen     6/21/25: N/V and abdominal pain improved with treatment of constipation. "Black liquid" noted with stools. Drop in H/H noted. EGD inpt versus OP    Hypokalemia  Patient's most recent potassium results are listed below.   Recent Labs     06/20/25  1127 " "06/21/25  0459   K 2.4* 3.8     Plan  - Replete potassium per protocol  - Monitor potassium Daily  - Patient's hypokalemia is worsening. Will continue current treatment    Hyponatremia  Hyponatremia is likely due to Dehydration/hypovolemia and tea and toast syndrome. The patient's most recent sodium results are listed below.  Recent Labs     06/20/25  1127 06/21/25  0459   * 132*     Plan  - Correct the sodium by 4-6mEq in 24 hours.   - Obtain the following studies: TSH, T4.  - Will treat the hyponatremia with IV fluids as follows: NS  - Monitor sodium Daily.   - Patient hyponatremia is worsening. Will continue current treatment    6/21/25: NA improving- cont IVfs  Leukocytosis  Etiology unclear  CXR/UA unremarkable   CT a/p- unremarkable   Received IV Zosyn in ED x one dose   Blood cultures show NGTD    6/21/25: Improving, monitor     General weakness  PT/OT    Constipation  Cont Linzess   Add Miralax and pericolace  Add Dulcolax x one dose     6/21/25: BM x 2. Abdominal pain and N/V improving.     Normocytic anemia  Anemia is likely due to unclear. Most recent hemoglobin and hematocrit are listed below.  Recent Labs     06/20/25  1127 06/21/25  0459   HGB 8.9* 6.3*   HCT 24.7* 18.7*     Plan  - Monitor serial CBC: Daily  - Transfuse PRBC if patient becomes hemodynamically unstable, symptomatic or H/H drops below 7/21.  - Patient has not received any PRBC transfusions to date  - Patient's anemia is currently stable  - FOBT negative     6/21/25: Iron studies normal. ?"Black liquid" with stool today. GI consulted. One unit PRBC. GI will plan inpt versus op EGD. Monitor     HTN (hypertension)  Patient's blood pressure range in the last 24 hours was: BP  Min: 101/58  Max: 155/72.The patient's inpatient anti-hypertensive regimen is listed below:  Current Antihypertensives  , 2 times daily, Oral  , Daily, Oral  verapamiL CR tablet 120 mg, 2 times daily, Oral    Plan  - BP is controlled, no changes needed to their " regimen    HLD (hyperlipidemia)  Stable   Cont statin     Chronic pain  Followed by pain management for chronic pain to back and legs   Cont Norco and gabapentin     VTE Risk Mitigation (From admission, onward)           Ordered     Place sequential compression device  Until discontinued         06/20/25 1701     IP VTE LOW RISK PATIENT  Once         06/20/25 1701     Place sequential compression device  Until discontinued         06/20/25 1701                    Discharge Planning   HARLAN:      Code Status: Full Code   Medical Readiness for Discharge Date:                      Please place Justification for DME      Gardenia Parker NP  Department of Hospital Medicine   [facility] - Telemetry ([facility])

## 2025-06-21 NOTE — ASSESSMENT & PLAN NOTE
- Patient with decline in Hb to 6.3 from 8.9 on admission after IVF but all cell lines are down with hydration. Suspect it may drop even more as patient gets adequately hydrated  - Do not suspect active GI bleeding at this time. Patient has actually been very constipated (likely related to her narcotic use) and BUN normal, noth of which go against active bleeding. The dark color of stool likely related to it being old stool  - Patient does have significant anemia for which GI workup with endoscopic evaluation would be recommended  - Would recommend to transfuse PRBC to keep Hb > 7. Monitor for any further drops as she continues to get hydrated and settle out at her baseline. May need an additional unit tomorrow  - Her abdominal pain has improved with treatment of constipation  - Will assess for appropriate timing of scopes for her and whether they need to be pursued inpatient vs outpatient

## 2025-06-21 NOTE — HPI
Patient is a 61 y/o WF with history of chronic pain on narcotics, PAD, carotid artery stenosis who presented to ED with weakness and fatigue. States she was having N/V the week before and was unable to keep anything down. Denied any GI bleeding. Had an associated abdominal pain in lower abdomen which felt like gases. On admission, she had a CT A/P done which showed mild constipation, otherwise unremarkable. Labs showed leukocytosis with WBC of 23 K, anemia with Hb of 8.9. Procalcitonin was normal. Patient was placed onto bowel regimen and had bowel movements with improvement in abdominal pain and able to tolerate her clear liquid diet this morning without issue. She had large bowel movement which was noted to be watery and dark. After IVF, all cell lines decreased including her Hb which fell to 6.3. Her WBC improved to 15 K and platelet count also diluted down. BUN normal. Due to concerns for possible GI bleed, GI consulted for further evaluation. Patient without any complaints at bedside. Patient states she had EGD/colonoscopy done at [facility] but its been a while, thinks she had an ulcer at one point but didn't get much issues from it. She was taking a few Ibuprofen last week but not every day and not prior to the vomiting starting.

## 2025-06-21 NOTE — CONSULTS
[facility] - Telemetry ([facility])  Gastroenterology  Consult Note    Patient Name: Rama Bravo  MRN: [id number]  Admission Date: 6/20/2025  Hospital Length of Stay: 1 days  Code Status: Full Code   Attending Provider: Rossana Martins MD   Consulting Provider: Keerthi Dubois MD  Primary Care Physician: Bibiana, Primary Doctor  Principal Problem:Intractable nausea and vomiting    Inpatient consult to Gastroenterology  Consult performed by: Keerthi Dubois MD  Consult ordered by: Rossana Martins MD  Reason for consult: Anemia        Subjective:     HPI:  Patient is a 61 y/o WF with history of chronic pain on narcotics, PAD, carotid artery stenosis who presented to ED with weakness and fatigue. States she was having N/V the week before and was unable to keep anything down. Denied any GI bleeding. Had an associated abdominal pain in lower abdomen which felt like gases. On admission, she had a CT A/P done which showed mild constipation, otherwise unremarkable. Labs showed leukocytosis with WBC of 23 K, anemia with Hb of 8.9. Procalcitonin was normal. Patient was placed onto bowel regimen and had bowel movements with improvement in abdominal pain and able to tolerate her clear liquid diet this morning without issue. She had large bowel movement which was noted to be watery and dark. After IVF, all cell lines decreased including her Hb which fell to 6.3. Her WBC improved to 15 K and platelet count also diluted down. BUN normal. Due to concerns for possible GI bleed, GI consulted for further evaluation. Patient without any complaints at bedside. Patient states she had EGD/colonoscopy done at [facility] but its been a while, thinks she had an ulcer at one point but didn't get much issues from it. She was taking a few Ibuprofen last week but not every day and not prior to the vomiting starting.       Past Medical History:   Diagnosis Date    Cancer     Diabetes mellitus     HLD (hyperlipidemia)     HPV (human papilloma virus) anogenital  infection     Hypertension     Movement disorder        Past Surgical History:   Procedure Laterality Date    adnoidectomy      CARPAL TUNNEL RELEASE      FOOT SURGERY      TONSILLECTOMY         Review of patient's allergies indicates:   Allergen Reactions    Sulfamethoxazole-trimethoprim Nausea Only    Azithromycin     Sulfa (sulfonamide antibiotics)      Family History       Problem Relation (Age of Onset)    Cancer Mother          Tobacco Use    Smoking status: Former     Types: Cigarettes    Smokeless tobacco: Never   Substance and Sexual Activity    Alcohol use: Yes    Drug use: Never    Sexual activity: Not Currently     Partners: Male     Review of Systems   Constitutional:  Negative for appetite change, fever and unexpected weight change.   HENT:  Negative for postnasal drip, rhinorrhea, sneezing, sore throat and trouble swallowing.    Eyes:  Negative for visual disturbance.   Respiratory:  Negative for cough, shortness of breath and wheezing.    Cardiovascular:  Negative for chest pain, palpitations and leg swelling.   Gastrointestinal:  Positive for abdominal pain, constipation, nausea and vomiting. Negative for blood in stool and diarrhea.   Genitourinary:  Negative for dysuria.   Musculoskeletal:  Negative for arthralgias, joint swelling and myalgias.   Skin:  Negative for color change, pallor and rash.   Neurological:  Negative for weakness, light-headedness, numbness and headaches.   Hematological:  Negative for adenopathy. Does not bruise/bleed easily.   Psychiatric/Behavioral:  Negative for agitation.      Objective:     Vital Signs (Most Recent):  Temp: 98.4 °F (36.9 °C) (06/21/25 0839)  Pulse: 81 (06/21/25 0839)  Resp: 17 (06/21/25 0839)  BP: (!) 102/59 (06/21/25 0839)  SpO2: 99 % (06/21/25 0839) Vital Signs (24h Range):  Temp:  [97.6 °F (36.4 °C)-98.4 °F (36.9 °C)] 98.4 °F (36.9 °C)  Pulse:  [] 81  Resp:  [14-20] 17  SpO2:  [96 %-100 %] 99 %  BP: (101-155)/(53-74) 102/59     Weight: 71.7 kg  (158 lb 1.1 oz) (06/20/25 2006)  Body mass index is 27.13 kg/m².      Intake/Output Summary (Last 24 hours) at 6/21/2025 1019  Last data filed at 6/20/2025 1753  Gross per 24 hour   Intake 480 ml   Output --   Net 480 ml       Lines/Drains/Airways       Peripheral Intravenous Line  Duration             Peripheral IV 06/20/25 1133 20 G 1 in Anterior;Proximal;Right Forearm <1 day                     Physical Exam  Vitals reviewed.   Constitutional:       General: She is not in acute distress.     Appearance: She is not diaphoretic.   HENT:      Head: Normocephalic and atraumatic.      Mouth/Throat:      Pharynx: No oropharyngeal exudate.   Eyes:      General: No scleral icterus.        Right eye: No discharge.         Left eye: No discharge.      Conjunctiva/sclera: Conjunctivae normal.      Pupils: Pupils are equal, round, and reactive to light.   Cardiovascular:      Rate and Rhythm: Normal rate and regular rhythm.   Abdominal:      General: There is no distension.      Palpations: Abdomen is soft. There is no mass.      Tenderness: There is no abdominal tenderness. There is no guarding.   Musculoskeletal:         General: Normal range of motion.      Cervical back: Normal range of motion.   Skin:     General: Skin is warm and dry.      Coloration: Skin is not pale.      Findings: No erythema or rash.   Neurological:      Mental Status: She is alert and oriented to person, place, and time.          Significant Labs:  All pertinent lab results from the last 24 hours have been reviewed.    Significant Imaging:  Imaging results within the past 24 hours have been reviewed.  Assessment/Plan:     Oncology  Normocytic anemia  - Patient with decline in Hb to 6.3 from 8.9 on admission after IVF but all cell lines are down with hydration. Suspect it may drop even more as patient gets adequately hydrated  - Do not suspect active GI bleeding at this time. Patient has actually been very constipated (likely related to her  narcotic use) and BUN normal, both of which go against active bleeding. The dark color of stool likely related to it being old stool  - Patient does have significant anemia for which GI workup with endoscopic evaluation would be recommended  - Would recommend to transfuse PRBC to keep Hb > 7. Monitor for any further drops as she continues to get hydrated and settle out at her baseline. May need an additional unit tomorrow  - Her abdominal pain has improved with treatment of constipation  - Will assess for appropriate timing of scopes for her and whether they need to be pursued inpatient vs outpatient        Thank you for your consult. I will follow-up with patient. Please contact us if you have any additional questions.    Keerthi Dubois MD  Gastroenterology  [facility] - Telemetry ([facility])

## 2025-06-21 NOTE — PT/OT/SLP PROGRESS
Physical Therapy      Patient Name:  Rama Bravo   MRN:  [id number]    Patient not seen today secondary to Nurse/ TAIWO hold. RN in room with patient sitting up EOB upon PT arrival, states that she is going to get blood for blood transfusion for pt. Requesting follow up after blood transfusion. Will follow-up at next available time.

## 2025-06-21 NOTE — PLAN OF CARE
A235/A235 BC Bravo is a 60 y.o.female admitted on 6/20/2025 for Intractable nausea and vomiting   Code Status: Full Code MRN: [id number]   Review of patient's allergies indicates:   Allergen Reactions    Sulfamethoxazole-trimethoprim Nausea Only    Azithromycin     Sulfa (sulfonamide antibiotics)      Past Medical History:   Diagnosis Date    Cancer     Diabetes mellitus     HLD (hyperlipidemia)     HPV (human papilloma virus) anogenital infection     Hypertension     Movement disorder       PRN meds    acetaminophen, 650 mg, Q4H PRN  albuterol-ipratropium, 3 mL, Q6H PRN  aluminum-magnesium hydroxide-simethicone, 30 mL, QID PRN  dextrose 50%, 12.5 g, PRN  dextrose 50%, 25 g, PRN  glucagon (human recombinant), 1 mg, PRN  glucose, 16 g, PRN  glucose, 24 g, PRN  HYDROcodone-acetaminophen, 1 tablet, Q8H PRN  melatonin, 6 mg, Nightly PRN  naloxone, 0.02 mg, PRN  ondansetron, 4 mg, Q6H PRN  prochlorperazine, 5 mg, Q6H PRN  simethicone, 1 tablet, QID PRN  sodium chloride 0.9%, 10 mL, Q8H PRN         Pt oriented x4.   Pt remained afebrile throughout this shift.   All meds administered per order.   Pt remained free of falls this shift.   Plan of care reviewed. Patient verbalizes understanding.   Pt moving/turning independently.   Bed low, side rails up x 2, wheels locked, call light in reach.   Patient instructed to call for assistance.  Patient education provided      Chart check completed.         Omar Coma Scale Score: 15     Lead Monitored: Lead II Rhythm: normal sinus rhythm    Cardiac/Telemetry Box Number: [id number]  VTE Core Measure: Pharmacological prophylaxis initiated/maintained Last Bowel Movement: 06/19/25  Diet Clear Liquid     Sigifredo Score: 20  Fall Risk Score: 7  Accucheck []   Freq?      Lines/Drains/Airways       Peripheral Intravenous Line  Duration             Peripheral IV 06/20/25 1133 20 G 1 in Anterior;Proximal;Right Forearm <1 day                    Problem: Adult Inpatient Plan of Care  Goal:  Plan of Care Review  Outcome: Progressing  Goal: Patient-Specific Goal (Individualized)  Outcome: Progressing  Goal: Absence of Hospital-Acquired Illness or Injury  Outcome: Progressing  Goal: Optimal Comfort and Wellbeing  Outcome: Progressing  Goal: Readiness for Transition of Care  Outcome: Progressing     Problem: Infection  Goal: Absence of Infection Signs and Symptoms  Outcome: Progressing

## 2025-06-21 NOTE — ASSESSMENT & PLAN NOTE
Patient's blood pressure range in the last 24 hours was: BP  Min: 101/58  Max: 155/72.The patient's inpatient anti-hypertensive regimen is listed below:  Current Antihypertensives  , 2 times daily, Oral  , Daily, Oral  verapamiL CR tablet 120 mg, 2 times daily, Oral    Plan  - BP is controlled, no changes needed to their regimen

## 2025-06-21 NOTE — ASSESSMENT & PLAN NOTE
Patient's most recent potassium results are listed below.   Recent Labs     06/20/25  1127 06/21/25  0459   K 2.4* 3.8     Plan  - Replete potassium per protocol  - Monitor potassium Daily  - Patient's hypokalemia is worsening. Will continue current treatment

## 2025-06-21 NOTE — SUBJECTIVE & OBJECTIVE
Past Medical History:   Diagnosis Date    Cancer     Diabetes mellitus     HLD (hyperlipidemia)     HPV (human papilloma virus) anogenital infection     Hypertension     Movement disorder        Past Surgical History:   Procedure Laterality Date    adnoidectomy      CARPAL TUNNEL RELEASE      FOOT SURGERY      TONSILLECTOMY         Review of patient's allergies indicates:   Allergen Reactions    Sulfamethoxazole-trimethoprim Nausea Only    Azithromycin     Sulfa (sulfonamide antibiotics)      Family History       Problem Relation (Age of Onset)    Cancer Mother          Tobacco Use    Smoking status: Former     Types: Cigarettes    Smokeless tobacco: Never   Substance and Sexual Activity    Alcohol use: Yes    Drug use: Never    Sexual activity: Not Currently     Partners: Male     Review of Systems   Constitutional:  Negative for appetite change, fever and unexpected weight change.   HENT:  Negative for postnasal drip, rhinorrhea, sneezing, sore throat and trouble swallowing.    Eyes:  Negative for visual disturbance.   Respiratory:  Negative for cough, shortness of breath and wheezing.    Cardiovascular:  Negative for chest pain, palpitations and leg swelling.   Gastrointestinal:  Positive for abdominal pain, constipation, nausea and vomiting. Negative for blood in stool and diarrhea.   Genitourinary:  Negative for dysuria.   Musculoskeletal:  Negative for arthralgias, joint swelling and myalgias.   Skin:  Negative for color change, pallor and rash.   Neurological:  Negative for weakness, light-headedness, numbness and headaches.   Hematological:  Negative for adenopathy. Does not bruise/bleed easily.   Psychiatric/Behavioral:  Negative for agitation.      Objective:     Vital Signs (Most Recent):  Temp: 98.4 °F (36.9 °C) (06/21/25 0839)  Pulse: 81 (06/21/25 0839)  Resp: 17 (06/21/25 0839)  BP: (!) 102/59 (06/21/25 0839)  SpO2: 99 % (06/21/25 0839) Vital Signs (24h Range):  Temp:  [97.6 °F (36.4 °C)-98.4 °F (36.9  °C)] 98.4 °F (36.9 °C)  Pulse:  [] 81  Resp:  [14-20] 17  SpO2:  [96 %-100 %] 99 %  BP: (101-155)/(53-74) 102/59     Weight: 71.7 kg (158 lb 1.1 oz) (06/20/25 2006)  Body mass index is 27.13 kg/m².      Intake/Output Summary (Last 24 hours) at 6/21/2025 1019  Last data filed at 6/20/2025 1753  Gross per 24 hour   Intake 480 ml   Output --   Net 480 ml       Lines/Drains/Airways       Peripheral Intravenous Line  Duration             Peripheral IV 06/20/25 1133 20 G 1 in Anterior;Proximal;Right Forearm <1 day                     Physical Exam  Vitals reviewed.   Constitutional:       General: She is not in acute distress.     Appearance: She is not diaphoretic.   HENT:      Head: Normocephalic and atraumatic.      Mouth/Throat:      Pharynx: No oropharyngeal exudate.   Eyes:      General: No scleral icterus.        Right eye: No discharge.         Left eye: No discharge.      Conjunctiva/sclera: Conjunctivae normal.      Pupils: Pupils are equal, round, and reactive to light.   Cardiovascular:      Rate and Rhythm: Normal rate and regular rhythm.   Abdominal:      General: There is no distension.      Palpations: Abdomen is soft. There is no mass.      Tenderness: There is no abdominal tenderness. There is no guarding.   Musculoskeletal:         General: Normal range of motion.      Cervical back: Normal range of motion.   Skin:     General: Skin is warm and dry.      Coloration: Skin is not pale.      Findings: No erythema or rash.   Neurological:      Mental Status: She is alert and oriented to person, place, and time.          Significant Labs:  All pertinent lab results from the last 24 hours have been reviewed.    Significant Imaging:  Imaging results within the past 24 hours have been reviewed.

## 2025-06-21 NOTE — ASSESSMENT & PLAN NOTE
Cont Linzess   Add Miralax and pericolace  Add Dulcolax x one dose     6/21/25: BM x 2. Abdominal pain and N/V improving.

## 2025-06-21 NOTE — ASSESSMENT & PLAN NOTE
Etiology unclear  CXR/UA unremarkable   CT a/p- unremarkable   Received IV Zosyn in ED x one dose   Blood cultures show NGTD    6/21/25: Improving, monitor

## 2025-06-21 NOTE — PT/OT/SLP PROGRESS
Occupational Therapy      Patient Name:  Rama Bravo   MRN:  [id number]    Patient not seen today secondary to Blood transfusion. Will follow-up tomorrow.    6/21/2025

## 2025-06-22 PROBLEM — D72.829 LEUKOCYTOSIS: Status: RESOLVED | Noted: 2025-06-20 | Resolved: 2025-06-22

## 2025-06-22 PROBLEM — E87.6 HYPOKALEMIA: Status: RESOLVED | Noted: 2025-06-20 | Resolved: 2025-06-22

## 2025-06-22 PROBLEM — E87.1 HYPONATREMIA: Status: RESOLVED | Noted: 2025-06-20 | Resolved: 2025-06-22

## 2025-06-22 LAB
ABO + RH BLD: NORMAL
ABSOLUTE EOSINOPHIL (OHS): 0.19 K/UL
ABSOLUTE MONOCYTE (OHS): 1.04 K/UL (ref 0.3–1)
ABSOLUTE NEUTROPHIL COUNT (OHS): 7.43 K/UL (ref 1.8–7.7)
ALBUMIN SERPL BCP-MCNC: 2.4 G/DL (ref 3.5–5.2)
ALP SERPL-CCNC: 54 UNIT/L (ref 40–150)
ALT SERPL W/O P-5'-P-CCNC: 15 UNIT/L (ref 10–44)
ANION GAP (OHS): 4 MMOL/L (ref 8–16)
AST SERPL-CCNC: 15 UNIT/L (ref 11–45)
BASOPHILS # BLD AUTO: 0.05 K/UL
BASOPHILS NFR BLD AUTO: 0.4 %
BILIRUB SERPL-MCNC: 0.4 MG/DL (ref 0.1–1)
BLD PROD TYP BPU: NORMAL
BLOOD UNIT EXPIRATION DATE: NORMAL
BLOOD UNIT TYPE CODE: 9500
BUN SERPL-MCNC: 12 MG/DL (ref 8–23)
CALCIUM SERPL-MCNC: 7.6 MG/DL (ref 8.7–10.5)
CHLORIDE SERPL-SCNC: 112 MMOL/L (ref 95–110)
CO2 SERPL-SCNC: 20 MMOL/L (ref 23–29)
CREAT SERPL-MCNC: 0.6 MG/DL (ref 0.5–1.4)
CROSSMATCH INTERPRETATION: NORMAL
DISPENSE STATUS: NORMAL
ERYTHROCYTE [DISTWIDTH] IN BLOOD BY AUTOMATED COUNT: 13.7 % (ref 11.5–14.5)
GFR SERPLBLD CREATININE-BSD FMLA CKD-EPI: >60 ML/MIN/1.73/M2
GLUCOSE SERPL-MCNC: 92 MG/DL (ref 70–110)
HCT VFR BLD AUTO: 19.1 % (ref 37–48.5)
HGB BLD-MCNC: 6.5 GM/DL (ref 12–16)
IMM GRANULOCYTES # BLD AUTO: 0.14 K/UL (ref 0–0.04)
IMM GRANULOCYTES NFR BLD AUTO: 1.1 % (ref 0–0.5)
LYMPHOCYTES # BLD AUTO: 3.36 K/UL (ref 1–4.8)
MAGNESIUM SERPL-MCNC: 1.7 MG/DL (ref 1.6–2.6)
MCH RBC QN AUTO: 31.7 PG (ref 27–31)
MCHC RBC AUTO-ENTMCNC: 34 G/DL (ref 32–36)
MCV RBC AUTO: 93 FL (ref 82–98)
NUCLEATED RBC (/100WBC) (OHS): 0 /100 WBC
PHOSPHATE SERPL-MCNC: 1.2 MG/DL (ref 2.7–4.5)
PLATELET # BLD AUTO: 279 K/UL (ref 150–450)
PMV BLD AUTO: 9.9 FL (ref 9.2–12.9)
POTASSIUM SERPL-SCNC: 3 MMOL/L (ref 3.5–5.1)
PROT SERPL-MCNC: 4.6 GM/DL (ref 6–8.4)
RBC # BLD AUTO: 2.05 M/UL (ref 4–5.4)
RELATIVE EOSINOPHIL (OHS): 1.6 %
RELATIVE LYMPHOCYTE (OHS): 27.5 % (ref 18–48)
RELATIVE MONOCYTE (OHS): 8.5 % (ref 4–15)
RELATIVE NEUTROPHIL (OHS): 60.9 % (ref 38–73)
SODIUM SERPL-SCNC: 136 MMOL/L (ref 136–145)
UNIT NUMBER: NORMAL
WBC # BLD AUTO: 12.21 K/UL (ref 3.9–12.7)

## 2025-06-22 PROCEDURE — 84100 ASSAY OF PHOSPHORUS: CPT | Performed by: NURSE PRACTITIONER

## 2025-06-22 PROCEDURE — 99233 SBSQ HOSP IP/OBS HIGH 50: CPT | Mod: ,,, | Performed by: INTERNAL MEDICINE

## 2025-06-22 PROCEDURE — 63600175 PHARM REV CODE 636 W HCPCS: Performed by: NURSE PRACTITIONER

## 2025-06-22 PROCEDURE — 86920 COMPATIBILITY TEST SPIN: CPT | Performed by: NURSE PRACTITIONER

## 2025-06-22 PROCEDURE — 21400001 HC TELEMETRY ROOM

## 2025-06-22 PROCEDURE — 36430 TRANSFUSION BLD/BLD COMPNT: CPT

## 2025-06-22 PROCEDURE — P9016 RBC LEUKOCYTES REDUCED: HCPCS | Performed by: NURSE PRACTITIONER

## 2025-06-22 PROCEDURE — 83735 ASSAY OF MAGNESIUM: CPT | Performed by: NURSE PRACTITIONER

## 2025-06-22 PROCEDURE — 86920 COMPATIBILITY TEST SPIN: CPT | Performed by: FAMILY MEDICINE

## 2025-06-22 PROCEDURE — 36415 COLL VENOUS BLD VENIPUNCTURE: CPT | Performed by: NURSE PRACTITIONER

## 2025-06-22 PROCEDURE — 86920 COMPATIBILITY TEST SPIN: CPT | Performed by: INTERNAL MEDICINE

## 2025-06-22 PROCEDURE — 85025 COMPLETE CBC W/AUTO DIFF WBC: CPT | Performed by: NURSE PRACTITIONER

## 2025-06-22 PROCEDURE — P9016 RBC LEUKOCYTES REDUCED: HCPCS | Performed by: FAMILY MEDICINE

## 2025-06-22 PROCEDURE — 25000003 PHARM REV CODE 250: Performed by: NURSE PRACTITIONER

## 2025-06-22 PROCEDURE — 80053 COMPREHEN METABOLIC PANEL: CPT | Performed by: NURSE PRACTITIONER

## 2025-06-22 RX ORDER — HYDROCODONE BITARTRATE AND ACETAMINOPHEN 500; 5 MG/1; MG/1
TABLET ORAL
Status: DISCONTINUED | OUTPATIENT
Start: 2025-06-22 | End: 2025-06-24 | Stop reason: HOSPADM

## 2025-06-22 RX ORDER — PANTOPRAZOLE SODIUM 40 MG/10ML
40 INJECTION, POWDER, LYOPHILIZED, FOR SOLUTION INTRAVENOUS 2 TIMES DAILY
Status: DISCONTINUED | OUTPATIENT
Start: 2025-06-22 | End: 2025-06-24 | Stop reason: HOSPADM

## 2025-06-22 RX ADMIN — GABAPENTIN 300 MG: 300 CAPSULE ORAL at 10:06

## 2025-06-22 RX ADMIN — FAMOTIDINE 20 MG: 20 TABLET ORAL at 10:06

## 2025-06-22 RX ADMIN — ATORVASTATIN CALCIUM 40 MG: 40 TABLET, FILM COATED ORAL at 08:06

## 2025-06-22 RX ADMIN — Medication 6 MG: at 08:06

## 2025-06-22 RX ADMIN — HYDROCODONE BITARTRATE AND ACETAMINOPHEN 1 TABLET: 10; 325 TABLET ORAL at 08:06

## 2025-06-22 RX ADMIN — PANTOPRAZOLE SODIUM 40 MG: 40 INJECTION, POWDER, FOR SOLUTION INTRAVENOUS at 08:06

## 2025-06-22 RX ADMIN — VERAPAMIL HYDROCHLORIDE 120 MG: 120 TABLET, FILM COATED, EXTENDED RELEASE ORAL at 08:06

## 2025-06-22 RX ADMIN — CETIRIZINE HYDROCHLORIDE 10 MG: 10 TABLET, FILM COATED ORAL at 08:06

## 2025-06-22 RX ADMIN — GABAPENTIN 300 MG: 300 CAPSULE ORAL at 08:06

## 2025-06-22 RX ADMIN — PANTOPRAZOLE SODIUM 40 MG: 40 INJECTION, POWDER, FOR SOLUTION INTRAVENOUS at 01:06

## 2025-06-22 RX ADMIN — SENNOSIDES AND DOCUSATE SODIUM 1 TABLET: 50; 8.6 TABLET ORAL at 10:06

## 2025-06-22 RX ADMIN — ESCITALOPRAM OXALATE 10 MG: 10 TABLET ORAL at 10:06

## 2025-06-22 RX ADMIN — POLYETHYLENE GLYCOL 3350 17 G: 17 POWDER, FOR SOLUTION ORAL at 10:06

## 2025-06-22 NOTE — SUBJECTIVE & OBJECTIVE
Subjective:     Interval History: Patient s/e at bedside. Hb remains around 6.3 this morning after further IVF resuscitation. S/p 1 unit PRBC given yesterday. Remains hemodynamically stable, although borderline low pressures this morning. Scheduled to receive 2 more units PRBC today.     Review of Systems   Constitutional:  Negative for appetite change, fever and unexpected weight change.   HENT:  Negative for postnasal drip, rhinorrhea, sneezing, sore throat and trouble swallowing.    Eyes:  Negative for visual disturbance.   Respiratory:  Negative for cough, shortness of breath and wheezing.    Cardiovascular:  Negative for chest pain, palpitations and leg swelling.   Gastrointestinal:  Negative for abdominal pain, blood in stool, constipation, diarrhea, nausea and vomiting.   Genitourinary:  Negative for dysuria.   Musculoskeletal:  Negative for arthralgias, joint swelling and myalgias.   Skin:  Negative for color change, pallor and rash.   Neurological:  Negative for weakness, light-headedness, numbness and headaches.   Hematological:  Negative for adenopathy. Does not bruise/bleed easily.   Psychiatric/Behavioral:  Negative for agitation.      Objective:     Vital Signs (Most Recent):  Temp: 98.1 °F (36.7 °C) (06/22/25 1007)  Pulse: 73 (06/22/25 1007)  Resp: 18 (06/22/25 1007)  BP: (!) 102/59 (06/22/25 1007)  SpO2: 100 % (06/22/25 1007) Vital Signs (24h Range):  Temp:  [98 °F (36.7 °C)-98.5 °F (36.9 °C)] 98.1 °F (36.7 °C)  Pulse:  [70-83] 73  Resp:  [17-20] 18  SpO2:  [96 %-100 %] 100 %  BP: ()/(44-59) 102/59     Weight: 72.8 kg (160 lb 7.9 oz) (06/22/25 0050)  Body mass index is 27.55 kg/m².      Intake/Output Summary (Last 24 hours) at 6/22/2025 1146  Last data filed at 6/22/2025 0923  Gross per 24 hour   Intake 240 ml   Output --   Net 240 ml       Lines/Drains/Airways       Peripheral Intravenous Line  Duration             Peripheral IV 06/20/25 1133 20 G 1 in Anterior;Proximal;Right Forearm 2  days                     Physical Exam  Vitals reviewed.   Constitutional:       General: She is not in acute distress.     Appearance: She is not diaphoretic.   HENT:      Head: Normocephalic and atraumatic.      Mouth/Throat:      Pharynx: No oropharyngeal exudate.   Eyes:      General: No scleral icterus.        Right eye: No discharge.         Left eye: No discharge.      Conjunctiva/sclera: Conjunctivae normal.      Pupils: Pupils are equal, round, and reactive to light.   Cardiovascular:      Rate and Rhythm: Normal rate and regular rhythm.   Abdominal:      General: There is no distension.      Palpations: Abdomen is soft. There is no mass.      Tenderness: There is no abdominal tenderness. There is no guarding.   Musculoskeletal:         General: Normal range of motion.      Cervical back: Normal range of motion.   Skin:     General: Skin is warm and dry.      Coloration: Skin is not pale.      Findings: No erythema or rash.   Neurological:      Mental Status: She is alert and oriented to person, place, and time.          Significant Labs:  All pertinent lab results from the last 24 hours have been reviewed.      Significant Imaging:  Imaging results within the past 24 hours have been reviewed.

## 2025-06-22 NOTE — PROGRESS NOTES
"[facility]  Progress Note    Patient Name: Rama Bravo  MRN: [id number]  Patient Class: IP- Inpatient   Admission Date: 6/20/2025  Length of Stay: 2 days  Attending Physician: Rossana Martins MD  Primary Care Provider: Bibiana, Primary Doctor        Subjective     Principal Problem:Intractable nausea and vomiting        HPI:  The patient is a 61 yo female with Chronic pain syndrome- followed by pain management, HTN, HLD, PAD and carotid artery stenosis who presented to ED with generalized weakness and fatigue. The patient reports since 6/12/25, she had persistent N/V and generalized abdominal pain. She reports she has N/V all day unable to eat and drink anything. Admonial pain is described as generalized cramping rated 8/10 on scale. She denies constipation or diarrhea. She states her stool has been "dark". Over the past few days, she had gradually worsening fatigue, generalized weakness, and light headedness. Today, she could barely walk without her legs giving out. Vomiting has improved over past 24 hours. Pt reports she had abdomianl discomfort associated with N/V on/off for over a year.     In the ED, mild tachycardia- resolved. BP stable. Labs revealed WBC 23, Hgb 8.9 (normocytic, unknown baseline), Na 127, K 2.4, BNP, Troponin normal. UA- normal. FOBT negative. LA normal, procalcitonin normal.   CT head- nothing acute. CXR- clear. CT bad/pelvis showed mild constipation, nothing acute.   The patient was given 1 liter IVFs, IV Zofran, IV Zosyn, IV KCL 10meq, and KCL 40meq.     Overview/Hospital Course:  The patient is a 61 yo female with Chronic pain syndrome- followed by pain management, HTN, HLD, PAD and carotid artery stenosis admitted with intractable abdominal pain and N/V, Hypokalemia, hyponatremia, and leukocytosis. CT abd/pelvis showed nothing acute, constipation. CXR/UA unremarkable. Blood cultures show NGTD. Pt placed on NS IVFs. K was repleted. Serum Na and K improved. " "Leukocytosis trended down. Pt placed on bowel regimen. Abdominal pain and N/V improved with treatment of constipation.   Pt noted to be anemic. Her Hgb dropped 8.9>6.3. Pt reported some black liquid mixed with stool. GI consulted. 1 unit PRBC ordered. Iron studies were normal. Pt has hx gastric ulcer approx 10 years ago. FOBT negative. Hgb only improved to 6.5. IV Protonix added. 2 units PRBC ordered. GI plans for EGD in am.      Interval History: N/V resolved. Abdominal pain improved but still required pain meds.  Reports "black liquid" with stool. Hgb dropped 8.9>6.3. Received one unit PRBC. Hgb only improved to 6.5. IV Protonix added. 2 units PRBC ordered. GI plans for EGD in am.      Review of Systems   Constitutional:  Positive for activity change, appetite change and fatigue.   Gastrointestinal:  Positive for abdominal pain (generalized, diffuse) and blood in stool (reports "black liquid" in her stools).   Musculoskeletal:  Positive for arthralgias and back pain.   Neurological:  Positive for weakness.     Objective:     Vital Signs (Most Recent):  Temp: 98.1 °F (36.7 °C) (06/22/25 1007)  Pulse: 73 (06/22/25 1007)  Resp: 18 (06/22/25 1007)  BP: (!) 102/59 (06/22/25 1007)  SpO2: 100 % (06/22/25 1007) Vital Signs (24h Range):  Temp:  [98 °F (36.7 °C)-98.5 °F (36.9 °C)] 98.1 °F (36.7 °C)  Pulse:  [70-83] 73  Resp:  [17-20] 18  SpO2:  [96 %-100 %] 100 %  BP: ()/(44-59) 102/59     Weight: 72.8 kg (160 lb 7.9 oz)  Body mass index is 27.55 kg/m².    Intake/Output Summary (Last 24 hours) at 6/22/2025 1132  Last data filed at 6/22/2025 0923  Gross per 24 hour   Intake 240 ml   Output --   Net 240 ml         Physical Exam  Vitals and nursing note reviewed.   Constitutional:       General: She is not in acute distress.     Appearance: She is well-developed. She is not diaphoretic.   HENT:      Head: Normocephalic and atraumatic.      Nose: Nose normal.   Eyes:      General: No scleral icterus.     " "Conjunctiva/sclera: Conjunctivae normal.   Neck:      Trachea: No tracheal deviation.   Cardiovascular:      Rate and Rhythm: Normal rate and regular rhythm.      Heart sounds: Normal heart sounds. No murmur heard.     No friction rub. No gallop.   Pulmonary:      Effort: Pulmonary effort is normal. No respiratory distress.      Breath sounds: Normal breath sounds. No stridor. No wheezing or rales.   Chest:      Chest wall: No tenderness.   Abdominal:      General: Bowel sounds are normal. There is no distension.      Palpations: Abdomen is soft. There is no mass.      Tenderness: There is abdominal tenderness (mild, generalized, diffuse). There is no guarding or rebound.   Musculoskeletal:         General: No tenderness or deformity. Normal range of motion.      Cervical back: Normal range of motion and neck supple.   Skin:     General: Skin is warm and dry.      Coloration: Skin is pale.      Findings: No erythema or rash.   Neurological:      General: No focal deficit present.      Mental Status: She is alert and oriented to person, place, and time.   Psychiatric:         Behavior: Behavior normal.         Thought Content: Thought content normal.               Significant Labs: All pertinent labs within the past 24 hours have been reviewed.    Significant Imaging: I have reviewed all pertinent imaging results/findings within the past 24 hours.      Assessment & Plan  Intractable nausea and vomiting and abdominal pain  CT a/p showed nothing acute, mild constipation   IV antiemetics prn  IVFs  Pt reports cyclical symptoms- Likely 2/2 gastroparesis/narcotic bowel   Check UDS for THC  Will need GI f/u   Start Pepcid  Clear liquids for now   Treat constipation with bowel regimen     6/21/25: N/V and abdominal pain improved with treatment of constipation. "Black liquid" noted with stools. Drop in H/H noted. EGD inpt versus OP    6/22/25: N/V resolved, some abdominal pain persists. Pt noted to be anemic. Her Hgb dropped " "8.9>6.3. Pt reported some black liquid mixed with stool. GI consulted. 1 unit PRBC ordered. Iron studies were normal. Pt has hx gastric ulcer approx 10 years ago. FOBT negative. Hgb only improved to 6.5. IV Protonix added. 2 units PRBC ordered. GI plans for EGD in am.      Normocytic anemia  Anemia is likely due to unclear. Most recent hemoglobin and hematocrit are listed below.  Recent Labs     06/20/25  1127 06/21/25  0459 06/22/25  0532   HGB 8.9* 6.3* 6.5*   HCT 24.7* 18.7* 19.1*     Plan  - Monitor serial CBC: Daily  - Transfuse PRBC if patient becomes hemodynamically unstable, symptomatic or H/H drops below 7/21.  - Patient has not received any PRBC transfusions to date  - Patient's anemia is currently stable  - FOBT negative     6/21/25: Iron studies normal. ?"Black liquid" with stool today. GI consulted. One unit PRBC. GI will plan inpt versus op EGD. Monitor   6/22/25: Hgb 6.5, will transfuse 2 units PRBC  General weakness  PT/OT    Constipation  Cont Linzess   Add Miralax and pericolace  Add Dulcolax x one dose     6/21/25: BM x 2. Abdominal pain and N/V improving.   6/22/25: +diarrhea with some black liquid- Bowel regimen discontinued.     HTN (hypertension)  Patient's blood pressure range in the last 24 hours was: BP  Min: 86/44  Max: 117/58.The patient's inpatient anti-hypertensive regimen is listed below:  Current Antihypertensives  , 2 times daily, Oral  , Daily, Oral  verapamiL CR tablet 120 mg, 2 times daily, Oral    Plan  - BP is controlled, no changes needed to their regimen    HLD (hyperlipidemia)  Stable   Cont statin     Chronic pain  Followed by pain management for chronic pain to back and legs   Cont Norco and gabapentin     VTE Risk Mitigation (From admission, onward)           Ordered     Place sequential compression device  Until discontinued         06/20/25 1701     IP VTE LOW RISK PATIENT  Once         06/20/25 1701     Place sequential compression device  Until discontinued         " 06/20/25 1701                    Discharge Planning   HARLAN:      Code Status: Full Code   Medical Readiness for Discharge Date:                      Please place Justification for DME      Gardenia Parker NP  Department of Hospital Medicine   [facility] ([facility])

## 2025-06-22 NOTE — PLAN OF CARE
[facility] - Tele[facility] (Hospital)  Initial Discharge Assessment       Primary Care Provider: No, Primary Doctor    Admission Diagnosis: Hypokalemia [E87.6]  Hyponatremia [E87.1]  Weakness [R53.1]  Tachycardia [R00.0]  Abdominal pain, unspecified abdominal location [R10.9]  Anemia, unspecified type [D64.9]  Sepsis, due to unspecified organism, unspecified whether acute organ dysfunction present [A41.9]  Intractable nausea and vomiting [R11.2]    Admission Date: 6/20/2025  Expected Discharge Date:     Transition of Care Barriers: None    Payor: BLUE CROSS BLUE SHIELD / Plan: BCBS ALL OUT OF STATE / Product Type: PPO /     Extended Emergency Contact Information  Primary Emergency Contact: BOO GARCES  Mobile Phone: [phone]  Relation: Relative  Preferred language: English   needed? No    Discharge Plan A: Home         [facility] [place], [place] - [address] [address]  Phone: [phone] Fax: [phone]      Initial Assessment (most recent)       Adult Discharge Assessment - 06/22/25 1200          Discharge Assessment    Assessment Type Discharge Planning Assessment     Confirmed/corrected address, phone number and insurance Yes     Confirmed Demographics Correct on Facesheet     Source of Information patient     Communicated HARLAN with patient/caregiver Date not available/Unable to determine     People in Home alone     Do you expect to return to your current living situation? Yes     Do you have help at home or someone to help you manage your care at home? No     Prior to hospitilization cognitive status: Alert/Oriented     Current cognitive status: Alert/Oriented     Walking or Climbing Stairs Difficulty no     Dressing/Bathing Difficulty no     Equipment Currently Used at Home none     Readmission within 30 days? No     Patient currently being followed by outpatient case management? No     Do you currently have service(s) that help you  manage your care at home? No     Do you take prescription medications? Yes     Do you have prescription coverage? Yes     Do you have any problems affording any of your prescribed medications? No     Is the patient taking medications as prescribed? yes     Who is going to help you get home at discharge? family     How do you get to doctors appointments? car, drives self     Are you on dialysis? No     Do you take coumadin? No     Discharge Plan A Home     DME Needed Upon Discharge  none     Discharge Plan discussed with: Patient     Transition of Care Barriers None

## 2025-06-22 NOTE — ASSESSMENT & PLAN NOTE
Cont Linzess   Add Miralax and pericolace  Add Dulcolax x one dose     6/21/25: BM x 2. Abdominal pain and N/V improving.   6/22/25: +diarrhea with some black liquid- Bowel regimen discontinued.

## 2025-06-22 NOTE — ASSESSMENT & PLAN NOTE
- Patient with symptomatic anemia  - Do not suspect active GI bleeding at this time but suspect that she has had a bleed previously or slow chronic loss  - Agree with transfusion of PRBC to keep Hb > 7. Should settle out now that patient has been appropriately hydrated  - Due to severity of anemia and dark stool noted this admission, will plan on EGD tomorrow  - Please keep NPO past MN  - Hold any anticoagulation

## 2025-06-22 NOTE — ASSESSMENT & PLAN NOTE
Patient's blood pressure range in the last 24 hours was: BP  Min: 86/44  Max: 117/58.The patient's inpatient anti-hypertensive regimen is listed below:  Current Antihypertensives  , 2 times daily, Oral  , Daily, Oral  verapamiL CR tablet 120 mg, 2 times daily, Oral    Plan  - BP is controlled, no changes needed to their regimen

## 2025-06-22 NOTE — PT/OT/SLP PROGRESS
Physical Therapy      Patient Name:  Rama Bravo   MRN:  [id number]    Initial PT order received and chart review completed. Patient not seen today secondary to patient receiving blood. Will continue efforts.    Nay Ortega, PT  06/22/25  10:40 AM

## 2025-06-22 NOTE — PT/OT/SLP PROGRESS
Occupational Therapy      Patient Name:  Rama Bravo   MRN:  [id number]    Patient not seen today secondary to Blood transfusion. Will follow-up tomorrow.    6/22/2025

## 2025-06-22 NOTE — ASSESSMENT & PLAN NOTE
"CT a/p showed nothing acute, mild constipation   IV antiemetics prn  IVFs  Pt reports cyclical symptoms- Likely 2/2 gastroparesis/narcotic bowel   Check UDS for THC  Will need GI f/u   Start Pepcid  Clear liquids for now   Treat constipation with bowel regimen     6/21/25: N/V and abdominal pain improved with treatment of constipation. "Black liquid" noted with stools. Drop in H/H noted. EGD inpt versus OP    6/22/25: N/V resolved, some abdominal pain persists. Pt noted to be anemic. Her Hgb dropped 8.9>6.3. Pt reported some black liquid mixed with stool. GI consulted. 1 unit PRBC ordered. Iron studies were normal. Pt has hx gastric ulcer approx 10 years ago. FOBT negative. Hgb only improved to 6.5. IV Protonix added. 2 units PRBC ordered. GI plans for EGD in am.      "

## 2025-06-22 NOTE — ASSESSMENT & PLAN NOTE
"Anemia is likely due to unclear. Most recent hemoglobin and hematocrit are listed below.  Recent Labs     06/20/25  1127 06/21/25  0459 06/22/25  0532   HGB 8.9* 6.3* 6.5*   HCT 24.7* 18.7* 19.1*     Plan  - Monitor serial CBC: Daily  - Transfuse PRBC if patient becomes hemodynamically unstable, symptomatic or H/H drops below 7/21.  - Patient has not received any PRBC transfusions to date  - Patient's anemia is currently stable  - FOBT negative     6/21/25: Iron studies normal. ?"Black liquid" with stool today. GI consulted. One unit PRBC. GI will plan inpt versus op EGD. Monitor   6/22/25: Hgb 6.5, will transfuse 2 units PRBC  "

## 2025-06-22 NOTE — PROGRESS NOTES
[facility] - Telemetry ([facility])  Gastroenterology  Progress Note    Patient Name: Rama Bravo  MRN: [id number]  Admission Date: 6/20/2025  Hospital Length of Stay: 2 days  Code Status: Full Code   Attending Provider: Rossana Martins MD  Consulting Provider: Keerthi Dubois MD  Primary Care Physician: Bibiana, Primary Doctor  Principal Problem: Intractable nausea and vomiting        Subjective:     Interval History: Patient s/e at bedside. Hb remains around 6.3 this morning after further IVF resuscitation. S/p 1 unit PRBC given yesterday. Remains hemodynamically stable, although borderline low pressures this morning. Scheduled to receive 2 more units PRBC today.     Review of Systems   Constitutional:  Negative for appetite change, fever and unexpected weight change.   HENT:  Negative for postnasal drip, rhinorrhea, sneezing, sore throat and trouble swallowing.    Eyes:  Negative for visual disturbance.   Respiratory:  Negative for cough, shortness of breath and wheezing.    Cardiovascular:  Negative for chest pain, palpitations and leg swelling.   Gastrointestinal:  Negative for abdominal pain, blood in stool, constipation, diarrhea, nausea and vomiting.   Genitourinary:  Negative for dysuria.   Musculoskeletal:  Negative for arthralgias, joint swelling and myalgias.   Skin:  Negative for color change, pallor and rash.   Neurological:  Negative for weakness, light-headedness, numbness and headaches.   Hematological:  Negative for adenopathy. Does not bruise/bleed easily.   Psychiatric/Behavioral:  Negative for agitation.      Objective:     Vital Signs (Most Recent):  Temp: 98.1 °F (36.7 °C) (06/22/25 1007)  Pulse: 73 (06/22/25 1007)  Resp: 18 (06/22/25 1007)  BP: (!) 102/59 (06/22/25 1007)  SpO2: 100 % (06/22/25 1007) Vital Signs (24h Range):  Temp:  [98 °F (36.7 °C)-98.5 °F (36.9 °C)] 98.1 °F (36.7 °C)  Pulse:  [70-83] 73  Resp:  [17-20] 18  SpO2:  [96 %-100 %] 100 %  BP: ()/(44-59) 102/59     Weight: 72.8 kg (160  lb 7.9 oz) (06/22/25 0050)  Body mass index is 27.55 kg/m².      Intake/Output Summary (Last 24 hours) at 6/22/2025 1146  Last data filed at 6/22/2025 0923  Gross per 24 hour   Intake 240 ml   Output --   Net 240 ml       Lines/Drains/Airways       Peripheral Intravenous Line  Duration             Peripheral IV 06/20/25 1133 20 G 1 in Anterior;Proximal;Right Forearm 2 days                     Physical Exam  Vitals reviewed.   Constitutional:       General: She is not in acute distress.     Appearance: She is not diaphoretic.   HENT:      Head: Normocephalic and atraumatic.      Mouth/Throat:      Pharynx: No oropharyngeal exudate.   Eyes:      General: No scleral icterus.        Right eye: No discharge.         Left eye: No discharge.      Conjunctiva/sclera: Conjunctivae normal.      Pupils: Pupils are equal, round, and reactive to light.   Cardiovascular:      Rate and Rhythm: Normal rate and regular rhythm.   Abdominal:      General: There is no distension.      Palpations: Abdomen is soft. There is no mass.      Tenderness: There is no abdominal tenderness. There is no guarding.   Musculoskeletal:         General: Normal range of motion.      Cervical back: Normal range of motion.   Skin:     General: Skin is warm and dry.      Coloration: Skin is not pale.      Findings: No erythema or rash.   Neurological:      Mental Status: She is alert and oriented to person, place, and time.          Significant Labs:  All pertinent lab results from the last 24 hours have been reviewed.      Significant Imaging:  Imaging results within the past 24 hours have been reviewed.  Assessment/Plan:     Oncology  Normocytic anemia  - Patient with symptomatic anemia  - Do not suspect active GI bleeding at this time but suspect that she has had a bleed previously or slow chronic loss  - Agree with transfusion of PRBC to keep Hb > 7. Should settle out now that patient has been appropriately hydrated  - Due to severity of anemia and  dark stool noted this admission, will plan on EGD tomorrow  - Please keep NPO past MN  - Hold any anticoagulation        Thank you for your consult. I will follow-up with patient. Please contact us if you have any additional questions.    Keerthi Dubois MD  Gastroenterology  [facility] - Telemetry ([facility])

## 2025-06-22 NOTE — SUBJECTIVE & OBJECTIVE
"Interval History: N/V resolved. Abdominal pain improved but still required pain meds.  Reports "black liquid" with stool. Hgb dropped 8.9>6.3. Received one unit PRBC. Hgb only improved to 6.5. IV Protonix added. 2 units PRBC ordered. GI plans for EGD in am.    Review of Systems   Constitutional:  Positive for activity change, appetite change and fatigue.   Gastrointestinal:  Positive for abdominal pain (generalized, diffuse) and blood in stool (reports "black liquid" in her stools).   Musculoskeletal:  Positive for arthralgias and back pain.   Neurological:  Positive for weakness.     Objective:     Vital Signs (Most Recent):  Temp: 98.1 °F (36.7 °C) (06/22/25 1007)  Pulse: 73 (06/22/25 1007)  Resp: 18 (06/22/25 1007)  BP: (!) 102/59 (06/22/25 1007)  SpO2: 100 % (06/22/25 1007) Vital Signs (24h Range):  Temp:  [98 °F (36.7 °C)-98.5 °F (36.9 °C)] 98.1 °F (36.7 °C)  Pulse:  [70-83] 73  Resp:  [17-20] 18  SpO2:  [96 %-100 %] 100 %  BP: ()/(44-59) 102/59     Weight: 72.8 kg (160 lb 7.9 oz)  Body mass index is 27.55 kg/m².    Intake/Output Summary (Last 24 hours) at 6/22/2025 1137  Last data filed at 6/22/2025 0923  Gross per 24 hour   Intake 240 ml   Output --   Net 240 ml         Physical Exam  Vitals and nursing note reviewed.   Constitutional:       General: She is not in acute distress.     Appearance: She is well-developed. She is not diaphoretic.   HENT:      Head: Normocephalic and atraumatic.      Nose: Nose normal.   Eyes:      General: No scleral icterus.     Conjunctiva/sclera: Conjunctivae normal.   Neck:      Trachea: No tracheal deviation.   Cardiovascular:      Rate and Rhythm: Normal rate and regular rhythm.      Heart sounds: Normal heart sounds. No murmur heard.     No friction rub. No gallop.   Pulmonary:      Effort: Pulmonary effort is normal. No respiratory distress.      Breath sounds: Normal breath sounds. No stridor. No wheezing or rales.   Chest:      Chest wall: No tenderness. "   Abdominal:      General: Bowel sounds are normal. There is no distension.      Palpations: Abdomen is soft. There is no mass.      Tenderness: There is abdominal tenderness (mild, generalized, diffuse). There is no guarding or rebound.   Musculoskeletal:         General: No tenderness or deformity. Normal range of motion.      Cervical back: Normal range of motion and neck supple.   Skin:     General: Skin is warm and dry.      Coloration: Skin is pale.      Findings: No erythema or rash.   Neurological:      General: No focal deficit present.      Mental Status: She is alert and oriented to person, place, and time.   Psychiatric:         Behavior: Behavior normal.         Thought Content: Thought content normal.               Significant Labs: All pertinent labs within the past 24 hours have been reviewed.    Significant Imaging: I have reviewed all pertinent imaging results/findings within the past 24 hours.

## 2025-06-23 ENCOUNTER — ANESTHESIA EVENT (OUTPATIENT)
Dept: ENDOSCOPY | Facility: HOSPITAL | Age: 60
End: 2025-06-23
Payer: COMMERCIAL

## 2025-06-23 ENCOUNTER — ANESTHESIA (OUTPATIENT)
Dept: ENDOSCOPY | Facility: HOSPITAL | Age: 60
End: 2025-06-23
Payer: COMMERCIAL

## 2025-06-23 DIAGNOSIS — Z87.11 HISTORY OF GASTRIC ULCER: Primary | ICD-10-CM

## 2025-06-23 LAB
ABSOLUTE EOSINOPHIL (OHS): 0.22 K/UL
ABSOLUTE MONOCYTE (OHS): 1.16 K/UL (ref 0.3–1)
ABSOLUTE NEUTROPHIL COUNT (OHS): 9 K/UL (ref 1.8–7.7)
ALBUMIN SERPL BCP-MCNC: 2.8 G/DL (ref 3.5–5.2)
ALP SERPL-CCNC: 59 UNIT/L (ref 40–150)
ALT SERPL W/O P-5'-P-CCNC: 20 UNIT/L (ref 10–44)
ANION GAP (OHS): 7 MMOL/L (ref 8–16)
AST SERPL-CCNC: 20 UNIT/L (ref 11–45)
BASOPHILS # BLD AUTO: 0.08 K/UL
BASOPHILS NFR BLD AUTO: 0.5 %
BILIRUB SERPL-MCNC: 0.8 MG/DL (ref 0.1–1)
BUN SERPL-MCNC: 7 MG/DL (ref 8–23)
CALCIUM SERPL-MCNC: 8 MG/DL (ref 8.7–10.5)
CHLORIDE SERPL-SCNC: 111 MMOL/L (ref 95–110)
CO2 SERPL-SCNC: 18 MMOL/L (ref 23–29)
CREAT SERPL-MCNC: 0.7 MG/DL (ref 0.5–1.4)
ERYTHROCYTE [DISTWIDTH] IN BLOOD BY AUTOMATED COUNT: 13.5 % (ref 11.5–14.5)
GFR SERPLBLD CREATININE-BSD FMLA CKD-EPI: >60 ML/MIN/1.73/M2
GLUCOSE SERPL-MCNC: 81 MG/DL (ref 70–110)
HCT VFR BLD AUTO: 26.6 % (ref 37–48.5)
HGB BLD-MCNC: 9.7 GM/DL (ref 12–16)
IMM GRANULOCYTES # BLD AUTO: 0.22 K/UL (ref 0–0.04)
IMM GRANULOCYTES NFR BLD AUTO: 1.5 % (ref 0–0.5)
LYMPHOCYTES # BLD AUTO: 4.1 K/UL (ref 1–4.8)
MAGNESIUM SERPL-MCNC: 1.6 MG/DL (ref 1.6–2.6)
MCH RBC QN AUTO: 32.3 PG (ref 27–31)
MCHC RBC AUTO-ENTMCNC: 36.5 G/DL (ref 32–36)
MCV RBC AUTO: 89 FL (ref 82–98)
NUCLEATED RBC (/100WBC) (OHS): 0 /100 WBC
PHOSPHATE SERPL-MCNC: 1.6 MG/DL (ref 2.7–4.5)
PLATELET # BLD AUTO: 296 K/UL (ref 150–450)
PMV BLD AUTO: 9.7 FL (ref 9.2–12.9)
POTASSIUM SERPL-SCNC: 2.8 MMOL/L (ref 3.5–5.1)
PROT SERPL-MCNC: 5.2 GM/DL (ref 6–8.4)
RBC # BLD AUTO: 3 M/UL (ref 4–5.4)
RELATIVE EOSINOPHIL (OHS): 1.5 %
RELATIVE LYMPHOCYTE (OHS): 27.7 % (ref 18–48)
RELATIVE MONOCYTE (OHS): 7.8 % (ref 4–15)
RELATIVE NEUTROPHIL (OHS): 61 % (ref 38–73)
SODIUM SERPL-SCNC: 136 MMOL/L (ref 136–145)
WBC # BLD AUTO: 14.78 K/UL (ref 3.9–12.7)

## 2025-06-23 PROCEDURE — 88305 TISSUE EXAM BY PATHOLOGIST: CPT | Mod: 26,,, | Performed by: PATHOLOGY

## 2025-06-23 PROCEDURE — 63600175 PHARM REV CODE 636 W HCPCS: Performed by: NURSE PRACTITIONER

## 2025-06-23 PROCEDURE — 88305 TISSUE EXAM BY PATHOLOGIST: CPT | Mod: TC | Performed by: INTERNAL MEDICINE

## 2025-06-23 PROCEDURE — 43239 EGD BIOPSY SINGLE/MULTIPLE: CPT | Mod: ,,, | Performed by: INTERNAL MEDICINE

## 2025-06-23 PROCEDURE — 63600175 PHARM REV CODE 636 W HCPCS: Performed by: INTERNAL MEDICINE

## 2025-06-23 PROCEDURE — 85025 COMPLETE CBC W/AUTO DIFF WBC: CPT | Performed by: NURSE PRACTITIONER

## 2025-06-23 PROCEDURE — 84155 ASSAY OF PROTEIN SERUM: CPT | Performed by: NURSE PRACTITIONER

## 2025-06-23 PROCEDURE — 0DB68ZX EXCISION OF STOMACH, VIA NATURAL OR ARTIFICIAL OPENING ENDOSCOPIC, DIAGNOSTIC: ICD-10-PCS | Performed by: INTERNAL MEDICINE

## 2025-06-23 PROCEDURE — 97165 OT EVAL LOW COMPLEX 30 MIN: CPT

## 2025-06-23 PROCEDURE — 63600175 PHARM REV CODE 636 W HCPCS: Performed by: NURSE ANESTHETIST, CERTIFIED REGISTERED

## 2025-06-23 PROCEDURE — 21400001 HC TELEMETRY ROOM

## 2025-06-23 PROCEDURE — 36415 COLL VENOUS BLD VENIPUNCTURE: CPT | Performed by: NURSE PRACTITIONER

## 2025-06-23 PROCEDURE — 25000003 PHARM REV CODE 250: Performed by: NURSE PRACTITIONER

## 2025-06-23 PROCEDURE — 25000003 PHARM REV CODE 250: Performed by: NURSE ANESTHETIST, CERTIFIED REGISTERED

## 2025-06-23 PROCEDURE — 43239 EGD BIOPSY SINGLE/MULTIPLE: CPT | Performed by: INTERNAL MEDICINE

## 2025-06-23 PROCEDURE — 84100 ASSAY OF PHOSPHORUS: CPT | Performed by: NURSE PRACTITIONER

## 2025-06-23 PROCEDURE — 83735 ASSAY OF MAGNESIUM: CPT | Performed by: NURSE PRACTITIONER

## 2025-06-23 PROCEDURE — 97161 PT EVAL LOW COMPLEX 20 MIN: CPT

## 2025-06-23 PROCEDURE — 27201012 HC FORCEPS, HOT/COLD, DISP: Performed by: FAMILY MEDICINE

## 2025-06-23 PROCEDURE — 25000003 PHARM REV CODE 250: Performed by: INTERNAL MEDICINE

## 2025-06-23 RX ORDER — POTASSIUM CHLORIDE 20 MEQ/1
40 TABLET, EXTENDED RELEASE ORAL ONCE
Status: COMPLETED | OUTPATIENT
Start: 2025-06-23 | End: 2025-06-23

## 2025-06-23 RX ORDER — LIDOCAINE HYDROCHLORIDE 10 MG/ML
INJECTION, SOLUTION EPIDURAL; INFILTRATION; INTRACAUDAL; PERINEURAL
Status: DISCONTINUED | OUTPATIENT
Start: 2025-06-23 | End: 2025-06-23

## 2025-06-23 RX ORDER — POTASSIUM CHLORIDE 7.45 MG/ML
10 INJECTION INTRAVENOUS
Status: COMPLETED | OUTPATIENT
Start: 2025-06-23 | End: 2025-06-23

## 2025-06-23 RX ORDER — PROPOFOL 10 MG/ML
VIAL (ML) INTRAVENOUS
Status: DISCONTINUED | OUTPATIENT
Start: 2025-06-23 | End: 2025-06-23

## 2025-06-23 RX ADMIN — CETIRIZINE HYDROCHLORIDE 10 MG: 10 TABLET, FILM COATED ORAL at 09:06

## 2025-06-23 RX ADMIN — PANTOPRAZOLE SODIUM 40 MG: 40 INJECTION, POWDER, FOR SOLUTION INTRAVENOUS at 08:06

## 2025-06-23 RX ADMIN — ATORVASTATIN CALCIUM 40 MG: 40 TABLET, FILM COATED ORAL at 09:06

## 2025-06-23 RX ADMIN — ESCITALOPRAM OXALATE 10 MG: 10 TABLET ORAL at 09:06

## 2025-06-23 RX ADMIN — PANTOPRAZOLE SODIUM 40 MG: 40 INJECTION, POWDER, FOR SOLUTION INTRAVENOUS at 09:06

## 2025-06-23 RX ADMIN — POTASSIUM CHLORIDE 10 MEQ: 10 INJECTION, SOLUTION INTRAVENOUS at 10:06

## 2025-06-23 RX ADMIN — POTASSIUM CHLORIDE 10 MEQ: 10 INJECTION, SOLUTION INTRAVENOUS at 12:06

## 2025-06-23 RX ADMIN — POTASSIUM CHLORIDE 40 MEQ: 1500 TABLET, EXTENDED RELEASE ORAL at 12:06

## 2025-06-23 RX ADMIN — PROPOFOL 100 MG: 10 INJECTION, EMULSION INTRAVENOUS at 11:06

## 2025-06-23 RX ADMIN — Medication 6 MG: at 09:06

## 2025-06-23 RX ADMIN — SODIUM CHLORIDE: 9 INJECTION, SOLUTION INTRAVENOUS at 10:06

## 2025-06-23 RX ADMIN — GABAPENTIN 300 MG: 300 CAPSULE ORAL at 09:06

## 2025-06-23 RX ADMIN — PROPOFOL 50 MG: 10 INJECTION, EMULSION INTRAVENOUS at 11:06

## 2025-06-23 RX ADMIN — VERAPAMIL HYDROCHLORIDE 120 MG: 120 TABLET, FILM COATED, EXTENDED RELEASE ORAL at 09:06

## 2025-06-23 RX ADMIN — LIDOCAINE HYDROCHLORIDE 50 MG: 10 SOLUTION INTRAVENOUS at 11:06

## 2025-06-23 NOTE — ASSESSMENT & PLAN NOTE
Anemia is likely due to unclear. Most recent hemoglobin and hematocrit are listed below.  Recent Labs     06/21/25  0459 06/22/25  0532 06/23/25  0413   HGB 6.3* 6.5* 9.7*   HCT 18.7* 19.1* 26.6*     Plan  - Monitor serial CBC: Daily  - Transfuse PRBC if patient becomes hemodynamically unstable, symptomatic or H/H drops below 7/21.  - Patient's anemia is currently stable

## 2025-06-23 NOTE — ANESTHESIA POSTPROCEDURE EVALUATION
Anesthesia Post Evaluation    Patient: Rama MON Lawrence    Procedure(s) Performed: * No procedures listed *    Final Anesthesia Type: MAC      Patient location during evaluation: GI PACU  Patient participation: Yes- Able to Participate  Level of consciousness: awake and alert  Post-procedure vital signs: reviewed and stable  Pain management: adequate  Airway patency: patent    PONV status at discharge: No PONV  Anesthetic complications: no      Cardiovascular status: blood pressure returned to baseline  Respiratory status: unassisted and spontaneous ventilation  Hydration status: euvolemic  Follow-up not needed.              Vitals Value Taken Time   /61 06/23/25 11:52   Temp 36.9 °C (98.4 °F) 06/23/25 11:52   Pulse 76 06/23/25 11:52   Resp 14 06/23/25 11:52   SpO2 95 % 06/23/25 11:52         No case tracking events are documented in the log.      Pain/Paula Score: Pain Rating Prior to Med Admin: 5 (6/22/2025  8:27 PM)  Pain Rating Post Med Admin: 3 (6/22/2025  9:27 PM)  Paula Score: 7 (6/23/2025 11:52 AM)

## 2025-06-23 NOTE — ASSESSMENT & PLAN NOTE
Patient's blood pressure range in the last 24 hours was: BP  Min: 101/61  Max: 163/75.The patient's inpatient anti-hypertensive regimen is listed below:  Current Antihypertensives  , 2 times daily, Oral  , Daily, Oral  verapamiL CR tablet 120 mg, 2 times daily, Oral    Plan  - BP is controlled, no changes needed to their regimen

## 2025-06-23 NOTE — PT/OT/SLP EVAL
"Occupational Therapy   Evaluation and Discharge Note    Name: Rama Bravo  MRN: [id number]  Admitting Diagnosis: Intractable nausea and vomiting  Recent Surgery: * No surgery found *      Recommendations:     Discharge Recommendations: No Therapy Indicated  Discharge Equipment Recommendations: none  Barriers to discharge:  None    Assessment:     Rama Bravo is a 60 y.o. female with a medical diagnosis of Intractable nausea and vomiting. At this time, patient is functioning at their prior level of function and does not require further acute OT services.     Plan:     During this hospitalization, patient does not require further acute OT services.  Please re-consult if situation changes.    Plan of Care Reviewed with: patient    Subjective     Chief Complaint: "I'm moving to [place] with my son next month. We just bought some land."  Patient/Family Comments/goals: Return home; healing.     Occupational Profile:  Living Environment: Lives with daughter in mobile home with 3 YANI and rail. Walk-in shower.   Previous level of function: Ind with ADLs and IADLs.   Roles and Routines: drives, retired.   Equipment Used at home: shower chair  Assistance upon Discharge: family    Pain/Comfort:  Pain Rating 1: 0/10    Patients cultural, spiritual, Scientologist conflicts given the current situation: no    Objective:     Communicated with: nurse prior to session.  Patient found up in chair with peripheral IV upon OT entry to room.    General Precautions: Standard, fall  Orthopedic Precautions: N/A  Braces: N/A  Respiratory Status: Room air     Occupational Performance:    Bed Mobility:    Not tested.    Functional Mobility/Transfers:  Patient completed Sit <> Stand Transfer with independence  with  no assistive device   Patient completed Bed <> Chair Transfer using Step Transfer technique with independence with no assistive device  Functional Mobility: Ambulated 10ft in room with Ind and no AD.     Activities of Daily " Living:  Feeding:  independence    Lower Body Dressing: independence to don/doff socks  Toileting: independence per report in room.    Cognitive/Visual Perceptual:  Cognitive/Psychosocial Skills:     -       Oriented to: Person, Place, Time, and Situation   -       Follows Commands/attention:Follows multistep  commands  -       Safety awareness/insight to disability: intact     Physical Exam:  Balance:    -       WNL  Upper Extremity Range of Motion:     -       Right Upper Extremity: WNL  -       Left Upper Extremity: WNL  Upper Extremity Strength:    -       Right Upper Extremity: WNL  -       Left Upper Extremity: WNL    AMPAC 6 Click ADL:  AMPAC Total Score: 24    Treatment & Education:  Patient was educated on role of OT, POC, Call Don't Fall precautions, and progressive mobility.       Patient left up in chair with all lines intact and call button in reach    GOALS:   Multidisciplinary Problems       Occupational Therapy Goals       Not on file                    DME Justifications:  No DME recommended requiring DME justifications    History:     Past Medical History:   Diagnosis Date    Cancer     Diabetes mellitus     HLD (hyperlipidemia)     HPV (human papilloma virus) anogenital infection     Hypertension     Movement disorder          Past Surgical History:   Procedure Laterality Date    adnoidectomy      CARPAL TUNNEL RELEASE      FOOT SURGERY      TONSILLECTOMY         Time Tracking:     OT Date of Treatment: 06/23/25  OT Start Time: 0847  OT Stop Time: 0854  OT Total Time (min): 7 min    Billable Minutes:Evaluation 7    6/23/2025

## 2025-06-23 NOTE — PT/OT/SLP EVAL
Physical Therapy Evaluation and Discharge Note    Patient Name:  Rama Bravo   MRN:  [id number]    Recommendations:     Discharge Recommendations: No Therapy Indicated  Discharge Equipment Recommendations: none   Barriers to discharge: None    Assessment:     Rama Bravo is a 60 y.o. female admitted with a medical diagnosis of Intractable nausea and vomiting. .  At this time, patient is functioning at their prior level of function and does not require further acute PT services.     Recent Surgery: * No surgery found *      Plan:     During this hospitalization, patient does not require further acute PT services.  Please re-consult if situation changes.      Subjective     Chief Complaint: Pt is motivated to participate  Patient/Family Comments/goals: none stated  Pain/Comfort:  Pain Rating 1: 8/10  Location - Side 1: Bilateral  Location - Orientation 1: generalized  Location 1: abdomen  Pain Addressed 1: Reposition, Distraction  Pain Rating Post-Intervention 1: 8/10    Patients cultural, spiritual, Adventist conflicts given the current situation: no    Living Environment:  Pt reports living with her step-daughter in a 1 story mobile home, 3 steps to enter with rail.   Prior to admission, patients level of function was INDEP with bathing/dressing, household and community ambulation, no AD, driving, retired.  Equipment used at home: none.  DME owned (not currently used): none.  Upon discharge, patient will have assistance from FAMILY.    Objective:     Communicated with epic chart review prior to session.  Patient found up in chair with peripheral IV, telemetry upon PT entry to room.    General Precautions: Standard, fall    Orthopedic Precautions:N/A   Braces: N/A  Respiratory Status: Room air    Exams:  Cognitive Exam:  Patient is oriented to Person, Place, Time, and Situation  Postural Exam:  Patient presented with the following abnormalities:    -       No postural abnormalities identified  Sensation:    -      "  Impaired  R hand  Skin Integrity/Edema:      -       Skin integrity: Visible skin intact  RLE ROM: WFL  RLE Strength: WFL  LLE ROM: WFL  LLE Strength: WFL    Functional Mobility:  Gait Belt Applied - Yes   Socks/Shoes Donned - Yes  Bed Mobility  Seated in chair at start of session and returned to chair  Transfers  Sit to Stand: independence with no AD  Stand Pivot to Chair: independence with no AD  Gait  Patient ambulated 200ft with no AD and independence. Patient demonstrates steady gait. No c/o dizziness or SOB, no LOB.  Balance  Sitting: independence  Standing: independence    AM-PAC 6 CLICK MOBILITY  Total Score:21       Treatment and Education:  Pt educated on role of PT in acute care. Educated on importance of OOB activities, activity pacing, and HEP (marching/hip flex, hip abd, heel slides/LAQ, quad sets, ankle pumps) in order to maintain/regain strength. Encouraged to sit up in chair for all meals. Educated on "call don't fall" policy and increased risk of falling due to weakness, instructed to utilize call bell for assistance with all transfers. Pt agreeable to all requests.      AM-PAC 6 CLICK MOBILITY  Total Score:21     Patient left up in chair with all lines intact and call button in reach.    GOALS:   Multidisciplinary Problems       Physical Therapy Goals       Not on file                    DME Justifications:  No DME recommended requiring DME justifications    History:     Past Medical History:   Diagnosis Date    Cancer     Diabetes mellitus     HLD (hyperlipidemia)     HPV (human papilloma virus) anogenital infection     Hypertension     Movement disorder        Past Surgical History:   Procedure Laterality Date    adnoidectomy      CARPAL TUNNEL RELEASE      FOOT SURGERY      TONSILLECTOMY         Time Tracking:     PT Received On: 06/23/25  PT Start Time: 1253     PT Stop Time: 1308  PT Total Time (min): 15 min     Billable Minutes: Evaluation 15min      06/23/2025  "

## 2025-06-23 NOTE — H&P
Short Stay Endoscopy History and Physical    PCP - No, Primary Doctor    Procedure - EGD  ASA - per anesthesia  Mallampati - per anesthesia  History of Anesthesia problems - no  Family history Anesthesia problems -  no     HPI:  This is a 60 y.o. female here for evaluation of :   Active Hospital Problems    Diagnosis  POA    *Intractable nausea and vomiting and abdominal pain [R11.2]  Yes    Normocytic anemia [D64.9]  Yes    HTN (hypertension) [I10]  Yes    HLD (hyperlipidemia) [E78.5]  Yes    Chronic pain [G89.29]  Yes    Constipation [K59.00]  Yes    General weakness [R53.1]  Yes      Resolved Hospital Problems    Diagnosis Date Resolved POA    Hypokalemia [E87.6] 06/22/2025 Yes    Hyponatremia [E87.1] 06/22/2025 Yes    Leukocytosis [D72.829] 06/22/2025 Yes         ROS:  CONSTITUTIONAL: Denies weight change,  fatigue, fevers, chills, night sweats.  CARDIOVASCULAR: Denies chest pain, shortness of breath, orthopnea and edema.  RESPIRATORY: Denies cough, hemoptysis, dyspnea, and wheezing.  GI: See HPI.    Medical History:   Past Medical History:   Diagnosis Date    Cancer     Diabetes mellitus     HLD (hyperlipidemia)     HPV (human papilloma virus) anogenital infection     Hypertension     Movement disorder        Surgical History:   Past Surgical History:   Procedure Laterality Date    adnoidectomy      CARPAL TUNNEL RELEASE      FOOT SURGERY      TONSILLECTOMY         Family History:  Family History   Problem Relation Name Age of Onset    Cancer Mother         Social History:   Social History[1]    Allergy  Review of patient's allergies indicates:   Allergen Reactions    Sulfamethoxazole-trimethoprim Nausea Only    Azithromycin     Sulfa (sulfonamide antibiotics)        Medications:   Medications Ordered Prior to Encounter[2]    Physical Exam:  Vital Signs:   Vitals:    06/23/25 0959   BP: (!) 145/69   Pulse: 77   Resp: 20   Temp: 97.2 °F (36.2 °C)     General Appearance: Well appearing in no acute distress  ENT:  OP clear  Chest: CTA B  CV: RRR, no m/r/g  Abd: s/nt/nd/nabs  Ext: no edema    Labs:  Reviewed    IMP:  Active Hospital Problems    Diagnosis  POA    *Intractable nausea and vomiting and abdominal pain [R11.2]  Yes    Normocytic anemia [D64.9]  Yes    HTN (hypertension) [I10]  Yes    HLD (hyperlipidemia) [E78.5]  Yes    Chronic pain [G89.29]  Yes    Constipation [K59.00]  Yes    General weakness [R53.1]  Yes      Resolved Hospital Problems    Diagnosis Date Resolved POA    Hypokalemia [E87.6] 06/22/2025 Yes    Hyponatremia [E87.1] 06/22/2025 Yes    Leukocytosis [D72.829] 06/22/2025 Yes         Plan:  I have explained the risks and benefits of endoscopy procedures to the patient including but not limited to bleeding, perforation, infection, and death. The patient wishes to proceed.         [1]   Social History  Tobacco Use    Smoking status: Former     Types: Cigarettes    Smokeless tobacco: Never   Vaping Use    Vaping status: Some Days    Substances: THC (last used x2 wks ago)   Substance Use Topics    Alcohol use: Yes     Comment: occ    Drug use: Never   [2]   No current facility-administered medications on file prior to encounter.     Current Outpatient Medications on File Prior to Encounter   Medication Sig Dispense Refill    atorvastatin (LIPITOR) 40 MG tablet Take 40 mg by mouth every evening.      cetirizine (ZYRTEC) 10 MG tablet Take 10 mg by mouth once daily.      ergocalciferol (ERGOCALCIFEROL) 50,000 unit Cap Take 50,000 Units by mouth every 7 days.      EScitalopram oxalate (LEXAPRO) 10 MG tablet Take 10 mg by mouth once daily.      gabapentin (NEURONTIN) 300 MG capsule Take 300 mg by mouth 2 (two) times daily.      hydroCHLOROthiazide (HYDRODIURIL) 50 MG tablet Take 50 mg by mouth once daily.      HYDROcodone-acetaminophen (NORCO)  mg per tablet Take 1 tablet by mouth 2 (two) times a day.      linaCLOtide (LINZESS) 145 mcg Cap capsule Take 145 mcg by mouth before breakfast.      promethazine  (PHENERGAN) 25 MG tablet Take 25 mg by mouth every 6 (six) hours as needed for Nausea.      verapamiL (CALAN-SR) 120 MG CR tablet Take 120 mg by mouth 2 (two) times daily.

## 2025-06-23 NOTE — PLAN OF CARE
Resting quietly. No distress noted. Ready for discharge to room. Report called to Gilma. Transported back to room via stretcher with dental appliances, eye glasses and telemetry by SUSANA Vanegas.

## 2025-06-23 NOTE — SUBJECTIVE & OBJECTIVE
Interval History: f/u hypokalemia tolerating oral intake discussed EGD findings and plan for PPI bid, replacing potassium likely home tomorrow if potassium improved in am    Review of Systems  Objective:     Vital Signs (Most Recent):  Temp: 98.4 °F (36.9 °C) (06/23/25 1152)  Pulse: 76 (06/23/25 1212)  Resp: 15 (06/23/25 1212)  BP: 131/68 (06/23/25 1212)  SpO2: 100 % (06/23/25 1212) Vital Signs (24h Range):  Temp:  [97.2 °F (36.2 °C)-98.5 °F (36.9 °C)] 98.4 °F (36.9 °C)  Pulse:  [] 76  Resp:  [12-20] 15  SpO2:  [95 %-100 %] 100 %  BP: (101-163)/(56-90) 131/68     Weight: 72.8 kg (160 lb 7.9 oz)  Body mass index is 27.55 kg/m².    Intake/Output Summary (Last 24 hours) at 6/23/2025 1309  Last data filed at 6/23/2025 1155  Gross per 24 hour   Intake 3828.67 ml   Output --   Net 3828.67 ml         Physical Exam  HENT:      Head: Normocephalic and atraumatic.   Cardiovascular:      Rate and Rhythm: Normal rate and regular rhythm.      Heart sounds: No murmur heard.  Pulmonary:      Effort: Pulmonary effort is normal. No respiratory distress.      Breath sounds: Normal breath sounds. No wheezing.   Abdominal:      General: Bowel sounds are normal. There is no distension.      Palpations: Abdomen is soft.      Tenderness: There is no abdominal tenderness.   Musculoskeletal:         General: No swelling.   Skin:     General: Skin is warm and dry.   Neurological:      Mental Status: She is alert and oriented to person, place, and time. Mental status is at baseline.               Significant Labs: All pertinent labs within the past 24 hours have been reviewed.  Recent Lab Results         06/23/25  0413        Albumin 2.8       ALP 59       ALT 20       Anion Gap 7  Comment: UNABLE TO CALCULATE       AST 20       Baso # 0.08       Basophil % 0.5       BILIRUBIN TOTAL 0.8  Comment: For infants and newborns, interpretation of results should be based   on gestational age, weight and in agreement with clinical    observations.    Premature Infant recommended reference ranges:   0-24 hours:  <8.0 mg/dL   24-48 hours: <12.0 mg/dL   3-5 days:    <15.0 mg/dL   6-29 days:   <15.0 mg/dL       BUN 7       Calcium 8.0       Chloride 111       CO2 18       Creatinine 0.7       eGFR >60  Comment: Estimated GFR calculated using the CKD-EPI creatinine (2021) equation.       Eos # 0.22       Eos % 1.5       Glucose 81       Gran # (ANC) 9.00       Hematocrit 26.6       Hemoglobin 9.7       Immature Grans (Abs) 0.22  Comment: Mild elevation in immature granulocytes is non specific and can be seen in a variety of conditions including stress response, acute inflammation, trauma and pregnancy. Correlation with other laboratory and clinical findings is essential.       Immature Granulocytes 1.5       Lymph # 4.10       Lymph % 27.7       Magnesium  1.6       MCH 32.3       MCHC 36.5       MCV 89       Mono # 1.16       Mono % 7.8       MPV 9.7       Neut % 61.0       nRBC 0       Phosphorus Level 1.6       Platelet Count 296       Potassium 2.8       PROTEIN TOTAL 5.2       RBC 3.00       RDW 13.5       Sodium 136       WBC 14.78               Significant Imaging: I have reviewed all pertinent imaging results/findings within the past 24 hours.

## 2025-06-23 NOTE — PLAN OF CARE
A235/A235 BC Bravo is a 60 y.o.female admitted on 6/20/2025 for Intractable nausea and vomiting   Code Status: Full Code MRN: [id number]   Review of patient's allergies indicates:   Allergen Reactions    Sulfamethoxazole-trimethoprim Nausea Only    Azithromycin     Sulfa (sulfonamide antibiotics)      Past Medical History:   Diagnosis Date    Cancer     Diabetes mellitus     HLD (hyperlipidemia)     HPV (human papilloma virus) anogenital infection     Hypertension     Movement disorder       PRN meds    0.9%  NaCl infusion (for blood administration), , Q24H PRN  0.9%  NaCl infusion (for blood administration), , Q24H PRN  0.9%  NaCl infusion (for blood administration), , Q24H PRN  acetaminophen, 650 mg, Q4H PRN  albuterol-ipratropium, 3 mL, Q6H PRN  aluminum-magnesium hydroxide-simethicone, 30 mL, QID PRN  dextrose 50%, 12.5 g, PRN  dextrose 50%, 25 g, PRN  glucagon (human recombinant), 1 mg, PRN  glucose, 16 g, PRN  glucose, 24 g, PRN  HYDROcodone-acetaminophen, 1 tablet, Q8H PRN  melatonin, 6 mg, Nightly PRN  naloxone, 0.02 mg, PRN  ondansetron, 4 mg, Q6H PRN  prochlorperazine, 5 mg, Q6H PRN  simethicone, 1 tablet, QID PRN  sodium chloride 0.9%, 10 mL, Q8H PRN         Pt oriented x4. Reports 2 loose dark stools this shift with intermittent abdominal pain.  Pt is tolerating clear liquid diet well.   Pt remained afebrile throughout this shift.   All meds administered per order.   Plan of care reviewed. Patient verbalizes understanding.   Pt moving/turning independently.   Bed low, side rails up x 2, wheels locked, call light in reach.   Patient instructed to call for assistance.  Patient education provided      Chart check completed.        [place] Coma Scale Score: 15     Lead Monitored: Lead II Rhythm: sinus tachycardia    Cardiac/Telemetry Box Number: [id number]  VTE Core Measure: Pharmacological prophylaxis initiated/maintained Last Bowel Movement: 06/22/25  Diet Clear Liquid  Diet NPO Except for: Sips with  Medication     Sigifredo Score: 22  Fall Risk Score: 7  Accucheck []   Freq?      Lines/Drains/Airways       Peripheral Intravenous Line  Duration             Peripheral IV 06/22/25 0930 22 G Anterior;Left Forearm <1 day                       Problem: Adult Inpatient Plan of Care  Goal: Plan of Care Review  Outcome: Progressing  Goal: Patient-Specific Goal (Individualized)  Outcome: Progressing  Goal: Absence of Hospital-Acquired Illness or Injury  Outcome: Progressing  Goal: Optimal Comfort and Wellbeing  Outcome: Progressing  Goal: Readiness for Transition of Care  Outcome: Progressing     Problem: Infection  Goal: Absence of Infection Signs and Symptoms  Outcome: Progressing     Problem: Fatigue  Goal: Improved Activity Tolerance  Outcome: Progressing

## 2025-06-23 NOTE — PROGRESS NOTES
"[facility] Medicine  Progress Note    Patient Name: Rama Bravo  MRN: [id number]  Patient Class: IP- Inpatient   Admission Date: 6/20/2025  Length of Stay: 3 days  Attending Physician: Sushila Burden MD  Primary Care Provider: Bibiana, Primary Doctor        Subjective     Principal Problem:Intractable nausea and vomiting        HPI:  The patient is a 61 yo female with Chronic pain syndrome- followed by pain management, HTN, HLD, PAD and carotid artery stenosis who presented to ED with generalized weakness and fatigue. The patient reports since 6/12/25, she had persistent N/V and generalized abdominal pain. She reports she has N/V all day unable to eat and drink anything. Admonial pain is described as generalized cramping rated 8/10 on scale. She denies constipation or diarrhea. She states her stool has been "dark". Over the past few days, she had gradually worsening fatigue, generalized weakness, and light headedness. Today, she could barely walk without her legs giving out. Vomiting has improved over past 24 hours. Pt reports she had abdomianl discomfort associated with N/V on/off for over a year.     In the ED, mild tachycardia- resolved. BP stable. Labs revealed WBC 23, Hgb 8.9 (normocytic, unknown baseline), Na 127, K 2.4, BNP, Troponin normal. UA- normal. FOBT negative. LA normal, procalcitonin normal.   CT head- nothing acute. CXR- clear. CT bad/pelvis showed mild constipation, nothing acute.   The patient was given 1 liter IVFs, IV Zofran, IV Zosyn, IV KCL 10meq, and KCL 40meq.     Overview/Hospital Course:  The patient is a 61 yo female with Chronic pain syndrome- followed by pain management, HTN, HLD, PAD and carotid artery stenosis admitted with intractable abdominal pain and N/V, Hypokalemia, hyponatremia, and leukocytosis. CT abd/pelvis showed nothing acute, constipation. CXR/UA unremarkable. Blood cultures show NGTD. Pt placed on NS IVFs. K was repleted. Serum Na and K " improved. Leukocytosis trended down. Pt placed on bowel regimen. Abdominal pain and N/V improved with treatment of constipation.   Pt noted to be anemic. Her Hgb dropped 8.9>6.3. Pt reported some black liquid mixed with stool. GI consulted. 1 unit PRBC ordered. Iron studies were normal. Pt has hx gastric ulcer approx 10 years ago. FOBT negative. Hgb only improved to 6.5. IV Protonix added. 2 units PRBC ordered. GI performed EGD. Two clean based small ulcerations in gastric antrum. Biopsies taken on 6/23. Potassium noted to be 2.8. IV and po replacement ordered.     Interval History: f/u hypokalemia tolerating oral intake discussed EGD findings and plan for PPI bid, replacing potassium likely home tomorrow if potassium improved in am    Review of Systems  Objective:     Vital Signs (Most Recent):  Temp: 98.4 °F (36.9 °C) (06/23/25 1152)  Pulse: 76 (06/23/25 1212)  Resp: 15 (06/23/25 1212)  BP: 131/68 (06/23/25 1212)  SpO2: 100 % (06/23/25 1212) Vital Signs (24h Range):  Temp:  [97.2 °F (36.2 °C)-98.5 °F (36.9 °C)] 98.4 °F (36.9 °C)  Pulse:  [] 76  Resp:  [12-20] 15  SpO2:  [95 %-100 %] 100 %  BP: (101-163)/(56-90) 131/68     Weight: 72.8 kg (160 lb 7.9 oz)  Body mass index is 27.55 kg/m².    Intake/Output Summary (Last 24 hours) at 6/23/2025 1309  Last data filed at 6/23/2025 1155  Gross per 24 hour   Intake 3828.67 ml   Output --   Net 3828.67 ml         Physical Exam  HENT:      Head: Normocephalic and atraumatic.   Cardiovascular:      Rate and Rhythm: Normal rate and regular rhythm.      Heart sounds: No murmur heard.  Pulmonary:      Effort: Pulmonary effort is normal. No respiratory distress.      Breath sounds: Normal breath sounds. No wheezing.   Abdominal:      General: Bowel sounds are normal. There is no distension.      Palpations: Abdomen is soft.      Tenderness: There is no abdominal tenderness.   Musculoskeletal:         General: No swelling.   Skin:     General: Skin is warm and dry.    Neurological:      Mental Status: She is alert and oriented to person, place, and time. Mental status is at baseline.               Significant Labs: All pertinent labs within the past 24 hours have been reviewed.  Recent Lab Results         06/23/25  0413        Albumin 2.8       ALP 59       ALT 20       Anion Gap 7  Comment: UNABLE TO CALCULATE       AST 20       Baso # 0.08       Basophil % 0.5       BILIRUBIN TOTAL 0.8  Comment: For infants and newborns, interpretation of results should be based   on gestational age, weight and in agreement with clinical   observations.    Premature Infant recommended reference ranges:   0-24 hours:  <8.0 mg/dL   24-48 hours: <12.0 mg/dL   3-5 days:    <15.0 mg/dL   6-29 days:   <15.0 mg/dL       BUN 7       Calcium 8.0       Chloride 111       CO2 18       Creatinine 0.7       eGFR >60  Comment: Estimated GFR calculated using the CKD-EPI creatinine (2021) equation.       Eos # 0.22       Eos % 1.5       Glucose 81       Gran # (ANC) 9.00       Hematocrit 26.6       Hemoglobin 9.7       Immature Grans (Abs) 0.22  Comment: Mild elevation in immature granulocytes is non specific and can be seen in a variety of conditions including stress response, acute inflammation, trauma and pregnancy. Correlation with other laboratory and clinical findings is essential.       Immature Granulocytes 1.5       Lymph # 4.10       Lymph % 27.7       Magnesium  1.6       MCH 32.3       MCHC 36.5       MCV 89       Mono # 1.16       Mono % 7.8       MPV 9.7       Neut % 61.0       nRBC 0       Phosphorus Level 1.6       Platelet Count 296       Potassium 2.8       PROTEIN TOTAL 5.2       RBC 3.00       RDW 13.5       Sodium 136       WBC 14.78               Significant Imaging: I have reviewed all pertinent imaging results/findings within the past 24 hours.      Assessment & Plan  Intractable nausea and vomiting and abdominal pain  CT a/p showed nothing acute, mild constipation   IV antiemetics  "prn  IVFs  Pt reports cyclical symptoms- Likely 2/2 gastroparesis/narcotic bowel   Check UDS for THC  Will need GI f/u   Start Pepcid  Clear liquids for now   Treat constipation with bowel regimen     6/21/25: N/V and abdominal pain improved with treatment of constipation. "Black liquid" noted with stools. Drop in H/H noted. EGD inpt versus OP    6/22/25: N/V resolved, some abdominal pain persists. Pt noted to be anemic. Her Hgb dropped 8.9>6.3. Pt reported some black liquid mixed with stool. GI consulted. 1 unit PRBC ordered. Iron studies were normal. Pt has hx gastric ulcer approx 10 years ago. FOBT negative. Hgb only improved to 6.5. IV Protonix added. 2 units PRBC ordered. GI plans for EGD in am.      6/23/25:EGD completed. Two clean based small ulcerations in gastric antrum. Biopsies taken   Normocytic anemia  Anemia is likely due to unclear. Most recent hemoglobin and hematocrit are listed below.  Recent Labs     06/21/25  0459 06/22/25  0532 06/23/25  0413   HGB 6.3* 6.5* 9.7*   HCT 18.7* 19.1* 26.6*     Plan  - Monitor serial CBC: Daily  - Transfuse PRBC if patient becomes hemodynamically unstable, symptomatic or H/H drops below 7/21.  - Patient's anemia is currently stable    General weakness  PT/OT    Constipation  resolved    HTN (hypertension)  Patient's blood pressure range in the last 24 hours was: BP  Min: 101/61  Max: 163/75.The patient's inpatient anti-hypertensive regimen is listed below:  Current Antihypertensives  , 2 times daily, Oral  , Daily, Oral  verapamiL CR tablet 120 mg, 2 times daily, Oral    Plan  - BP is controlled, no changes needed to their regimen    HLD (hyperlipidemia)  Stable   Cont statin     Chronic pain  Followed by pain management for chronic pain to back and legs   Cont Norco and gabapentin     VTE Risk Mitigation (From admission, onward)           Ordered     Place sequential compression device  Until discontinued         06/20/25 1701     IP VTE LOW RISK PATIENT  Once      "    06/20/25 1701     Place sequential compression device  Until discontinued         06/20/25 1701                    Discharge Planning   HARLAN: 6/24/2025     Code Status: Full Code   Medical Readiness for Discharge Date:   Discharge Plan A: Home                  Please place Justification for DME      Sushila Burden MD  Department of Hospital Medicine   '[place] - Telemetry ([facility])

## 2025-06-23 NOTE — PLAN OF CARE
06/23/25 1524   Rounds   Attendance Provider;Nurse ;Charge nurse;Physical therapist   Discharge Plan A Home   Why the patient remains in the hospital Requires continued medical care   Transition of Care Barriers None

## 2025-06-23 NOTE — TRANSFER OF CARE
"Anesthesia Transfer of Care Note    Patient: Rama MON Lawrence    Procedure(s) Performed: * No procedures listed *    Patient location: GI    Anesthesia Type: MAC    Transport from OR: Transported from OR on room air with adequate spontaneous ventilation    Post pain: adequate analgesia    Post assessment: no apparent anesthetic complications    Post vital signs: stable    Level of consciousness: sedated    Nausea/Vomiting: no nausea/vomiting    Complications: none    Transfer of care protocol was followed      Last vitals: Visit Vitals  /61   Pulse 76   Temp 36.9 °C (98.4 °F)   Resp 14   Ht 5' 4" (1.626 m)   Wt 72.8 kg (160 lb 7.9 oz)   SpO2 95%   Breastfeeding No   BMI 27.55 kg/m²     "

## 2025-06-23 NOTE — ASSESSMENT & PLAN NOTE
"CT a/p showed nothing acute, mild constipation   IV antiemetics prn  IVFs  Pt reports cyclical symptoms- Likely 2/2 gastroparesis/narcotic bowel   Check UDS for THC  Will need GI f/u   Start Pepcid  Clear liquids for now   Treat constipation with bowel regimen     6/21/25: N/V and abdominal pain improved with treatment of constipation. "Black liquid" noted with stools. Drop in H/H noted. EGD inpt versus OP    6/22/25: N/V resolved, some abdominal pain persists. Pt noted to be anemic. Her Hgb dropped 8.9>6.3. Pt reported some black liquid mixed with stool. GI consulted. 1 unit PRBC ordered. Iron studies were normal. Pt has hx gastric ulcer approx 10 years ago. FOBT negative. Hgb only improved to 6.5. IV Protonix added. 2 units PRBC ordered. GI plans for EGD in am.      6/23/25:EGD completed. Two clean based small ulcerations in gastric antrum. Biopsies taken   "

## 2025-06-23 NOTE — ANESTHESIA PREPROCEDURE EVALUATION
06/23/2025  Rama Brvao is a 60 y.o., female.      Pre-op Assessment    I have reviewed the Patient Summary Reports.    I have reviewed the NPO Status.   I have reviewed the Medications.     Review of Systems  Anesthesia Hx:  No problems with previous Anesthesia                Social:  Former Smoker       Cardiovascular:     Hypertension, well controlled           hyperlipidemia   ECG has been reviewed.                            Hepatic/GI:        N/V pain             Musculoskeletal:         Spine Disorders: lumbar            Endocrine:  Diabetes, well controlled, type 2               Physical Exam  General: Well nourished    Airway:  Mallampati: II   Mouth Opening: Normal  TM Distance: Normal  Tongue: Normal  Neck ROM: Normal ROM    Dental:  Intact    Chest/Lungs:  Normal Respiratory Rate    Heart:  Rate: Normal  Rhythm: Regular Rhythm    Anesthesia Plan  Type of Anesthesia, risks & benefits discussed:    Anesthesia Type: MAC  Intra-op Monitoring Plan: Standard ASA Monitors  Post Op Pain Control Plan: multimodal analgesia  Induction:  IV  Informed Consent: Informed consent signed with the Patient and all parties understand the risks and agree with anesthesia plan.  All questions answered.   ASA Score: 3  Day of Surgery Review of History & Physical: H&P Update referred to the surgeon/provider.    Ready For Surgery From Anesthesia Perspective.   .

## 2025-06-23 NOTE — PLAN OF CARE
PT EVAL complete. Pt INDEP with all mobility, ambulated 200ft INDEP, no AD. No therapy indicated upon d/c. At this time, patient is functioning at their prior level of function and does not require further acute PT services. Please re-consult if situation changes.

## 2025-06-24 LAB
ABSOLUTE EOSINOPHIL (OHS): 0.19 K/UL
ABSOLUTE MONOCYTE (OHS): 1.15 K/UL (ref 0.3–1)
ABSOLUTE NEUTROPHIL COUNT (OHS): 10.64 K/UL (ref 1.8–7.7)
ALBUMIN SERPL BCP-MCNC: 3.1 G/DL (ref 3.5–5.2)
ALP SERPL-CCNC: 73 UNIT/L (ref 40–150)
ALT SERPL W/O P-5'-P-CCNC: 26 UNIT/L (ref 10–44)
ANION GAP (OHS): 11 MMOL/L (ref 8–16)
AST SERPL-CCNC: 30 UNIT/L (ref 11–45)
BASOPHILS # BLD AUTO: 0.06 K/UL
BASOPHILS NFR BLD AUTO: 0.4 %
BILIRUB SERPL-MCNC: 0.6 MG/DL (ref 0.1–1)
BUN SERPL-MCNC: 4 MG/DL (ref 8–23)
CALCIUM SERPL-MCNC: 8.3 MG/DL (ref 8.7–10.5)
CHLORIDE SERPL-SCNC: 111 MMOL/L (ref 95–110)
CO2 SERPL-SCNC: 15 MMOL/L (ref 23–29)
CREAT SERPL-MCNC: 0.7 MG/DL (ref 0.5–1.4)
ERYTHROCYTE [DISTWIDTH] IN BLOOD BY AUTOMATED COUNT: 13.8 % (ref 11.5–14.5)
ESTROGEN SERPL-MCNC: NORMAL PG/ML
GFR SERPLBLD CREATININE-BSD FMLA CKD-EPI: >60 ML/MIN/1.73/M2
GLUCOSE SERPL-MCNC: 92 MG/DL (ref 70–110)
HCT VFR BLD AUTO: 28.8 % (ref 37–48.5)
HGB BLD-MCNC: 10 GM/DL (ref 12–16)
IMM GRANULOCYTES # BLD AUTO: 0.25 K/UL (ref 0–0.04)
IMM GRANULOCYTES NFR BLD AUTO: 1.6 % (ref 0–0.5)
INSULIN SERPL-ACNC: NORMAL U[IU]/ML
LAB AP CLINICAL INFORMATION: NORMAL
LAB AP GROSS DESCRIPTION: NORMAL
LAB AP PERFORMING LOCATION(S): NORMAL
LAB AP REPORT FOOTNOTES: NORMAL
LYMPHOCYTES # BLD AUTO: 3.42 K/UL (ref 1–4.8)
MCH RBC QN AUTO: 31.6 PG (ref 27–31)
MCHC RBC AUTO-ENTMCNC: 34.7 G/DL (ref 32–36)
MCV RBC AUTO: 91 FL (ref 82–98)
NUCLEATED RBC (/100WBC) (OHS): 0 /100 WBC
PLATELET # BLD AUTO: 376 K/UL (ref 150–450)
PMV BLD AUTO: 9.5 FL (ref 9.2–12.9)
POTASSIUM SERPL-SCNC: 4.1 MMOL/L (ref 3.5–5.1)
PROT SERPL-MCNC: 6.1 GM/DL (ref 6–8.4)
RBC # BLD AUTO: 3.16 M/UL (ref 4–5.4)
RELATIVE EOSINOPHIL (OHS): 1.2 %
RELATIVE LYMPHOCYTE (OHS): 21.8 % (ref 18–48)
RELATIVE MONOCYTE (OHS): 7.3 % (ref 4–15)
RELATIVE NEUTROPHIL (OHS): 67.7 % (ref 38–73)
SODIUM SERPL-SCNC: 137 MMOL/L (ref 136–145)
WBC # BLD AUTO: 15.71 K/UL (ref 3.9–12.7)

## 2025-06-24 PROCEDURE — 85025 COMPLETE CBC W/AUTO DIFF WBC: CPT | Performed by: INTERNAL MEDICINE

## 2025-06-24 PROCEDURE — 80053 COMPREHEN METABOLIC PANEL: CPT | Performed by: INTERNAL MEDICINE

## 2025-06-24 PROCEDURE — 25000003 PHARM REV CODE 250: Performed by: NURSE PRACTITIONER

## 2025-06-24 PROCEDURE — 36415 COLL VENOUS BLD VENIPUNCTURE: CPT | Performed by: INTERNAL MEDICINE

## 2025-06-24 PROCEDURE — 63600175 PHARM REV CODE 636 W HCPCS: Performed by: NURSE PRACTITIONER

## 2025-06-24 RX ORDER — PANTOPRAZOLE SODIUM 40 MG/1
40 TABLET, DELAYED RELEASE ORAL 2 TIMES DAILY
Qty: 180 TABLET | Refills: 0 | Status: SHIPPED | OUTPATIENT
Start: 2025-06-24

## 2025-06-24 RX ADMIN — PANTOPRAZOLE SODIUM 40 MG: 40 INJECTION, POWDER, FOR SOLUTION INTRAVENOUS at 09:06

## 2025-06-24 RX ADMIN — GABAPENTIN 300 MG: 300 CAPSULE ORAL at 09:06

## 2025-06-24 RX ADMIN — ONDANSETRON 4 MG: 2 INJECTION INTRAMUSCULAR; INTRAVENOUS at 06:06

## 2025-06-24 RX ADMIN — ESCITALOPRAM OXALATE 10 MG: 10 TABLET ORAL at 09:06

## 2025-06-24 RX ADMIN — VERAPAMIL HYDROCHLORIDE 120 MG: 120 TABLET, FILM COATED, EXTENDED RELEASE ORAL at 09:06

## 2025-06-24 NOTE — PLAN OF CARE
[facility] - Telemetry (Hospital)  Discharge Final Note    Primary Care Provider: No, Primary Doctor    Expected Discharge Date: 6/24/2025    Final Discharge Note (most recent)       Final Note - 06/24/25 0951          Final Note    Assessment Type Final Discharge Note     Anticipated Discharge Disposition Home or Self Care     Hospital Resources/Appts/Education Provided Post-Acute resouces added to AVS        Post-Acute Status    Discharge Delays None known at this time                     Important Message from Medicare             Contact Info       Keerthi Dubois MD   Specialty: Gastroenterology, Internal Medicine    [address] [place] [address] [address] FABRIZIO MADRID [id number]   Phone: [phone]       Next Steps: Follow up          DC disposition:home with family   [facility] [place] [facility]. Patient to schedule hospital follow up.     No other CM needs noted.

## 2025-06-24 NOTE — PLAN OF CARE
Problem: Adult Inpatient Plan of Care  Goal: Plan of Care Review  Outcome: Adequate for Care Transition  Goal: Patient-Specific Goal (Individualized)  Outcome: Adequate for Care Transition  Goal: Absence of Hospital-Acquired Illness or Injury  Outcome: Adequate for Care Transition  Goal: Optimal Comfort and Wellbeing  Outcome: Adequate for Care Transition  Goal: Readiness for Transition of Care  Outcome: Adequate for Care Transition     Problem: Infection  Goal: Absence of Infection Signs and Symptoms  Outcome: Adequate for Care Transition     Problem: Fatigue  Goal: Improved Activity Tolerance  Outcome: Adequate for Care Transition

## 2025-06-24 NOTE — PLAN OF CARE
Problem: Adult Inpatient Plan of Care  Goal: Plan of Care Review  Outcome: Progressing  Goal: Patient-Specific Goal (Individualized)  Outcome: Progressing  Goal: Absence of Hospital-Acquired Illness or Injury  Outcome: Progressing  Goal: Optimal Comfort and Wellbeing  Outcome: Progressing  Goal: Readiness for Transition of Care  Outcome: Progressing     Problem: Infection  Goal: Absence of Infection Signs and Symptoms  Outcome: Progressing     Problem: Fatigue  Goal: Improved Activity Tolerance  Outcome: Progressing

## 2025-06-24 NOTE — DISCHARGE SUMMARY
"[facility] - Tele[facility] ([facility])  [facility] Medicine  Discharge Summary      Patient Name: Rama Bravo  MRN: [id number]  NATHALIE: [id number]  Patient Class: IP- Inpatient  Admission Date: 6/20/2025  Hospital Length of Stay: 4 days  Discharge Date and Time: 6/24/2025  3:00 PM  Attending Physician: Bibiana att. providers found   Discharging Provider: Sushila Burden MD  Primary Care Provider: Sharmila Jack Primary    Primary Care Team: Networked reference to record PCT     HPI:   The patient is a 59 yo female with Chronic pain syndrome- followed by pain management, HTN, HLD, PAD and carotid artery stenosis who presented to ED with generalized weakness and fatigue. The patient reports since 6/12/25, she had persistent N/V and generalized abdominal pain. She reports she has N/V all day unable to eat and drink anything. Admonial pain is described as generalized cramping rated 8/10 on scale. She denies constipation or diarrhea. She states her stool has been "dark". Over the past few days, she had gradually worsening fatigue, generalized weakness, and light headedness. Today, she could barely walk without her legs giving out. Vomiting has improved over past 24 hours. Pt reports she had abdomianl discomfort associated with N/V on/off for over a year.     In the ED, mild tachycardia- resolved. BP stable. Labs revealed WBC 23, Hgb 8.9 (normocytic, unknown baseline), Na 127, K 2.4, BNP, Troponin normal. UA- normal. FOBT negative. LA normal, procalcitonin normal.   CT head- nothing acute. CXR- clear. CT bad/pelvis showed mild constipation, nothing acute.   The patient was given 1 liter IVFs, IV Zofran, IV Zosyn, IV KCL 10meq, and KCL 40meq.     * No surgery found *      Hospital Course:   The patient is a 59 yo female with Chronic pain syndrome- followed by pain management, HTN, HLD, PAD and carotid artery stenosis admitted with intractable abdominal pain and N/V, Hypokalemia, hyponatremia, and leukocytosis. CT abd/pelvis showed " nothing acute, constipation. CXR/UA unremarkable. Blood cultures show NGTD. Pt placed on NS IVFs. K was repleted. Serum Na and K improved. Leukocytosis trended down. Pt placed on bowel regimen. Abdominal pain and N/V improved with treatment of constipation.   Pt noted to be anemic. Her Hgb dropped 8.9>6.3. Pt reported some black liquid mixed with stool. GI consulted. 1 unit PRBC ordered. Iron studies were normal. Pt has hx gastric ulcer approx 10 years ago. FOBT negative. Hgb only improved to 6.5. IV Protonix added. 2 units PRBC ordered. GI performed EGD. Two clean based small ulcerations in gastric antrum. Biopsies taken on 6/23. Potassium noted to be 2.8. IV and po replacement ordered. Potassium improved. She was tolerating oral intake. Repeat scope in 2 months discussed with patient. Patient reported she is moving to [place]. Informed patient no matter her location she needed to have repeat EGD. She verbalized understanding. Patient seen and examined, stable for discharge.     Goals of Care Treatment Preferences:  Code Status: Full Code         Consults:   Consults (From admission, onward)          Status Ordering Provider     Inpatient consult to Gastroenterology  Once        Provider:  Keerthi Dubois MD    Completed JODI DOMINGUEZ            Final Active Diagnoses:    Diagnosis Date Noted POA    PRINCIPAL PROBLEM:  Intractable nausea and vomiting and abdominal pain [R11.2] 06/20/2025 Yes    Normocytic anemia [D64.9] 06/20/2025 Yes    HTN (hypertension) [I10] 06/20/2025 Yes    HLD (hyperlipidemia) [E78.5] 06/20/2025 Yes    Chronic pain [G89.29] 06/20/2025 Yes    Constipation [K59.00] 06/20/2025 Yes    General weakness [R53.1] 06/07/2024 Yes      Problems Resolved During this Admission:    Diagnosis Date Noted Date Resolved POA    Hypokalemia [E87.6] 06/20/2025 06/22/2025 Yes    Hyponatremia [E87.1] 06/20/2025 06/22/2025 Yes    Leukocytosis [D72.829] 06/20/2025 06/22/2025 Yes       Discharged Condition:  stable    Disposition: Home or Self Care    Follow Up:   Follow-up Information       Keerthi Dubois MD Follow up.    Specialties: Gastroenterology, Internal Medicine  Contact information:  [address] [place] [address] DR Lorenza MADRID [id number] [phone]                           Patient Instructions:      Diet Adult Regular     Follow-up primary physician   Standing Status: Future Standing Exp. Date: 06/24/26     Activity as tolerated       Significant Diagnostic Studies: Radiology:   Imaging Results              CT Head Without Contrast (Final result)  Result time 06/20/25 13:23:00      Final result by LAINE Jimenez Sr., MD (06/20/25 13:23:00)                   Impression:      Normal study.    All CT scans at this facility use dose modulation, iterative reconstruction, and/or weight base dosing when appropriate to reduce radiation dose when appropriate to reduce radiation dose to as low as reasonably achievable.      Electronically signed by: Isaac Jimenez MD  Date:    06/20/2025  Time:    13:23               Narrative:    EXAMINATION:  CT HEAD WITHOUT CONTRAST    CLINICAL HISTORY:  Dizziness, persistent/recurrent, cardiac or vascular cause suspected;    TECHNIQUE:  Standard brain CT protocol without IV contrast was performed.    COMPARISON:  None    FINDINGS:  The ventricles have a normal size, position, and appearance. There is no abnormal intracranial mass or intracranial hemorrhage. There is no skull fracture. The paranasal sinuses are normal in appearance.                                       CT Abdomen Pelvis With IV Contrast NO Oral Contrast (Final result)  Result time 06/20/25 13:45:38      Final result by Roberto Bates MD (06/20/25 13:45:38)                   Impression:      Source of patient's acute abdominal pain not identified.    All CT scans at this facility use dose modulation, iterative reconstruction, and/or weight based dosing when appropriate to reduce radiation dose to as low as  reasonable achievable.      Electronically signed by: Roberto Bates MD  Date:    06/20/2025  Time:    13:45               Narrative:    EXAMINATION:  CT ABDOMEN PELVIS WITH IV CONTRAST    CLINICAL HISTORY:  Abdominal pain, acute, nonlocalized;    TECHNIQUE:  Low dose axial images, sagittal and coronal reformations were obtained from the lung bases to the pubic symphysis following the IV administration of 100 mL of Omnipaque 350.  Oral contrast was not administered.    COMPARISON:  None    FINDINGS:  Heart: Normal size. No effusion.    Lung Bases: Clear.    Liver: Normal size and attenuation. No focal lesions.    Gallbladder: No calcified gallstones.    Bile Ducts: No dilatation.    Pancreas: No mass. No peripancreatic fat stranding.    Spleen: Normal.    Adrenals: Normal.    Kidneys/Ureters: Normal enhancement.  No mass or  hydroureteronephrosis.    Bladder: No wall thickening.    Reproductive organs: Normal.    GI Tract/Mesentery: No evidence of bowel obstruction or inflammation.  No evidence of appendicitis.  Mild constipation.    Peritoneal Space: No ascites or free air.    Retroperitoneum: No significant adenopathy.    Abdominal wall: Normal.    Vasculature: No aneurysm.    Bones: No acute fracture.  Mild facet arthropathy lower lumbar spine.  No suspicious lytic or sclerotic lesions.                                       X-Ray Chest PA And Lateral (Final result)  Result time 06/20/25 11:08:11      Final result by LAINE Jimenez Sr., MD (06/20/25 11:08:11)                   Impression:      1. The lungs are clear.  2. There is a healed fracture in the lateral aspect of the left 5th rib.  .      Electronically signed by: Isaac Jimenez MD  Date:    06/20/2025  Time:    11:08               Narrative:    EXAMINATION:  XR CHEST PA AND LATERAL    CLINICAL HISTORY:  Weakness    COMPARISON:  None    FINDINGS:  The size and contour of the heart are normal. The lungs are clear. There is no pneumothorax or pleural  effusion.  There is a healed fracture in the lateral aspect of the left 5th rib.                                        Pending Diagnostic Studies:       None           Medications:  Reconciled Home Medications:      Medication List        START taking these medications      pantoprazole 40 MG tablet  Commonly known as: PROTONIX  Take 1 tablet (40 mg total) by mouth 2 (two) times daily.            CONTINUE taking these medications      atorvastatin 40 MG tablet  Commonly known as: LIPITOR  Take 40 mg by mouth every evening.     cetirizine 10 MG tablet  Commonly known as: ZYRTEC  Take 10 mg by mouth once daily.     ergocalciferol 50,000 unit Cap  Commonly known as: ERGOCALCIFEROL  Take 50,000 Units by mouth every 7 days.     EScitalopram oxalate 10 MG tablet  Commonly known as: LEXAPRO  Take 10 mg by mouth once daily.     gabapentin 300 MG capsule  Commonly known as: NEURONTIN  Take 300 mg by mouth 2 (two) times daily.     hydroCHLOROthiazide 50 MG tablet  Commonly known as: HYDRODIURIL  Take 50 mg by mouth once daily.     HYDROcodone-acetaminophen  mg per tablet  Commonly known as: NORCO  Take 1 tablet by mouth 2 (two) times a day.     LINZESS 145 mcg Cap capsule  Generic drug: linaCLOtide  Take 145 mcg by mouth before breakfast.     promethazine 25 MG tablet  Commonly known as: PHENERGAN  Take 25 mg by mouth every 6 (six) hours as needed for Nausea.     verapamiL 120 MG CR tablet  Commonly known as: CALAN-SR  Take 120 mg by mouth 2 (two) times daily.              Indwelling Lines/Drains at time of discharge:   Lines/Drains/Airways       Airway  Duration                  Airway - Non-Surgical 06/23/25 1145 Nasal Cannula 1 day                        Time spent on the discharge of patient: 31 minutes         Sushila Burden MD  Department of Hospital Medicine  [facility] - Telemetry ([facility])

## 2025-06-24 NOTE — DISCHARGE INSTRUCTIONS
Our goal at [facility] is to always give you outstanding care and exceptional service. You may receive a survey from [employer] by mail, text or e-mail in the next 24-48 hours asking about the care you received with us. The survey should only take 5-10 minutes to complete and is very important to us.     Your feedback provides us with a way to recognize our staff who work tirelessly to provide the best care! Also, your responses help us learn how to improve when your experience was below our aspiration of excellence. We are always looking for ways to improve your stay. We WILL use your feedback to continue making improvements to help us provide the highest quality care. We keep your personal information and feedback confidential. We appreciate your time completing this survey and can't wait to hear from you!!!    We look forward to your continued care with us! Thanks so much for choosing [facility] for your healthcare needs!

## 2025-06-25 ENCOUNTER — RESULTS FOLLOW-UP (OUTPATIENT)
Dept: GASTROENTEROLOGY | Facility: CLINIC | Age: 60
End: 2025-06-25

## 2025-06-25 VITALS
RESPIRATION RATE: 18 BRPM | SYSTOLIC BLOOD PRESSURE: 108 MMHG | TEMPERATURE: 98 F | DIASTOLIC BLOOD PRESSURE: 59 MMHG | HEIGHT: 64 IN | BODY MASS INDEX: 28.31 KG/M2 | HEART RATE: 84 BPM | WEIGHT: 165.81 LBS | OXYGEN SATURATION: 99 %

## 2025-06-26 LAB
BACTERIA BLD CULT: NORMAL
BACTERIA BLD CULT: NORMAL